# Patient Record
Sex: FEMALE | Race: ASIAN | NOT HISPANIC OR LATINO | Employment: UNEMPLOYED
[De-identification: names, ages, dates, MRNs, and addresses within clinical notes are randomized per-mention and may not be internally consistent; named-entity substitution may affect disease eponyms.]

---

## 2017-02-09 ENCOUNTER — RECORDS - HEALTHEAST (OUTPATIENT)
Dept: ADMINISTRATIVE | Facility: OTHER | Age: 62
End: 2017-02-09

## 2017-08-17 ENCOUNTER — RECORDS - HEALTHEAST (OUTPATIENT)
Dept: ADMINISTRATIVE | Facility: OTHER | Age: 62
End: 2017-08-17

## 2017-12-08 ENCOUNTER — RECORDS - HEALTHEAST (OUTPATIENT)
Dept: ADMINISTRATIVE | Facility: OTHER | Age: 62
End: 2017-12-08

## 2018-05-18 ENCOUNTER — RECORDS - HEALTHEAST (OUTPATIENT)
Dept: ADMINISTRATIVE | Facility: OTHER | Age: 63
End: 2018-05-18

## 2018-11-08 ENCOUNTER — OFFICE VISIT - HEALTHEAST (OUTPATIENT)
Dept: FAMILY MEDICINE | Facility: CLINIC | Age: 63
End: 2018-11-08

## 2018-11-08 DIAGNOSIS — Z23 NEED FOR VACCINATION: ICD-10-CM

## 2018-11-08 DIAGNOSIS — M54.2 CERVICAL PAIN: ICD-10-CM

## 2018-11-08 DIAGNOSIS — E11.9 DIABETES MELLITUS, TYPE 2 (H): ICD-10-CM

## 2018-11-08 DIAGNOSIS — S32.001A LUMBAR BURST FRACTURE (H): ICD-10-CM

## 2018-11-08 DIAGNOSIS — R73.9 HYPERGLYCEMIA: ICD-10-CM

## 2018-11-08 LAB
ALBUMIN SERPL-MCNC: 3.8 G/DL (ref 3.5–5)
ALP SERPL-CCNC: 88 U/L (ref 45–120)
ALT SERPL W P-5'-P-CCNC: 16 U/L (ref 0–45)
ANION GAP SERPL CALCULATED.3IONS-SCNC: 10 MMOL/L (ref 5–18)
AST SERPL W P-5'-P-CCNC: 19 U/L (ref 0–40)
BILIRUB SERPL-MCNC: 0.8 MG/DL (ref 0–1)
BUN SERPL-MCNC: 12 MG/DL (ref 8–22)
CALCIUM SERPL-MCNC: 9.4 MG/DL (ref 8.5–10.5)
CHLORIDE BLD-SCNC: 103 MMOL/L (ref 98–107)
CHOLEST SERPL-MCNC: 196 MG/DL
CO2 SERPL-SCNC: 27 MMOL/L (ref 22–31)
CREAT SERPL-MCNC: 0.63 MG/DL (ref 0.6–1.1)
FASTING STATUS PATIENT QL REPORTED: NO
GFR SERPL CREATININE-BSD FRML MDRD: >60 ML/MIN/1.73M2
GLUCOSE BLD-MCNC: 134 MG/DL (ref 70–125)
HBA1C MFR BLD: 7.4 % (ref 3.5–6)
HDLC SERPL-MCNC: 41 MG/DL
LDLC SERPL CALC-MCNC: 126 MG/DL
POTASSIUM BLD-SCNC: 4 MMOL/L (ref 3.5–5)
PROT SERPL-MCNC: 8.5 G/DL (ref 6–8)
SODIUM SERPL-SCNC: 140 MMOL/L (ref 136–145)
TRIGL SERPL-MCNC: 144 MG/DL

## 2018-11-08 ASSESSMENT — MIFFLIN-ST. JEOR: SCORE: 1469.95

## 2018-12-04 ENCOUNTER — OFFICE VISIT - HEALTHEAST (OUTPATIENT)
Dept: FAMILY MEDICINE | Facility: CLINIC | Age: 63
End: 2018-12-04

## 2018-12-04 DIAGNOSIS — R80.9 MICROALBUMINURIA: ICD-10-CM

## 2018-12-04 DIAGNOSIS — E11.9 TYPE 2 DIABETES MELLITUS WITHOUT COMPLICATION, WITHOUT LONG-TERM CURRENT USE OF INSULIN (H): ICD-10-CM

## 2018-12-04 LAB
CREAT UR-MCNC: 65.5 MG/DL
MICROALBUMIN UR-MCNC: 3.86 MG/DL (ref 0–1.99)
MICROALBUMIN/CREAT UR: 58.9 MG/G

## 2018-12-04 ASSESSMENT — MIFFLIN-ST. JEOR: SCORE: 911.49

## 2018-12-05 ENCOUNTER — COMMUNICATION - HEALTHEAST (OUTPATIENT)
Dept: FAMILY MEDICINE | Facility: CLINIC | Age: 63
End: 2018-12-05

## 2019-01-15 ENCOUNTER — OFFICE VISIT - HEALTHEAST (OUTPATIENT)
Dept: FAMILY MEDICINE | Facility: CLINIC | Age: 64
End: 2019-01-15

## 2019-01-15 DIAGNOSIS — E11.9 TYPE 2 DIABETES MELLITUS WITHOUT COMPLICATION, WITHOUT LONG-TERM CURRENT USE OF INSULIN (H): ICD-10-CM

## 2019-01-15 DIAGNOSIS — Z12.31 VISIT FOR SCREENING MAMMOGRAM: ICD-10-CM

## 2019-01-15 DIAGNOSIS — R80.9 MICROALBUMINURIA: ICD-10-CM

## 2019-01-15 DIAGNOSIS — E78.5 HYPERLIPIDEMIA, UNSPECIFIED HYPERLIPIDEMIA TYPE: ICD-10-CM

## 2019-01-15 LAB
ALT SERPL W P-5'-P-CCNC: 23 U/L (ref 0–45)
ANION GAP SERPL CALCULATED.3IONS-SCNC: 10 MMOL/L (ref 5–18)
AST SERPL W P-5'-P-CCNC: 22 U/L (ref 0–40)
BUN SERPL-MCNC: 10 MG/DL (ref 8–22)
CALCIUM SERPL-MCNC: 9.1 MG/DL (ref 8.5–10.5)
CHLORIDE BLD-SCNC: 104 MMOL/L (ref 98–107)
CHOLEST SERPL-MCNC: 137 MG/DL
CO2 SERPL-SCNC: 25 MMOL/L (ref 22–31)
CREAT SERPL-MCNC: 0.64 MG/DL (ref 0.6–1.1)
FASTING STATUS PATIENT QL REPORTED: YES
GFR SERPL CREATININE-BSD FRML MDRD: >60 ML/MIN/1.73M2
GLUCOSE BLD-MCNC: 114 MG/DL (ref 70–125)
HBA1C MFR BLD: 7.5 % (ref 3.5–6)
HDLC SERPL-MCNC: 34 MG/DL
LDLC SERPL CALC-MCNC: 67 MG/DL
POTASSIUM BLD-SCNC: 3.9 MMOL/L (ref 3.5–5)
SODIUM SERPL-SCNC: 139 MMOL/L (ref 136–145)
TRIGL SERPL-MCNC: 180 MG/DL

## 2019-01-21 ENCOUNTER — COMMUNICATION - HEALTHEAST (OUTPATIENT)
Dept: FAMILY MEDICINE | Facility: CLINIC | Age: 64
End: 2019-01-21

## 2019-04-22 ENCOUNTER — RECORDS - HEALTHEAST (OUTPATIENT)
Dept: RADIOLOGY | Facility: CLINIC | Age: 64
End: 2019-04-22

## 2019-04-22 ENCOUNTER — OFFICE VISIT - HEALTHEAST (OUTPATIENT)
Dept: FAMILY MEDICINE | Facility: CLINIC | Age: 64
End: 2019-04-22

## 2019-04-22 ENCOUNTER — RECORDS - HEALTHEAST (OUTPATIENT)
Dept: MAMMOGRAPHY | Facility: CLINIC | Age: 64
End: 2019-04-22

## 2019-04-22 DIAGNOSIS — E11.9 TYPE 2 DIABETES MELLITUS WITHOUT COMPLICATION, WITHOUT LONG-TERM CURRENT USE OF INSULIN (H): ICD-10-CM

## 2019-04-22 DIAGNOSIS — Z12.11 SCREEN FOR COLON CANCER: ICD-10-CM

## 2019-04-22 DIAGNOSIS — M54.2 CERVICAL PAIN: ICD-10-CM

## 2019-04-22 DIAGNOSIS — E78.5 HYPERLIPIDEMIA, UNSPECIFIED HYPERLIPIDEMIA TYPE: ICD-10-CM

## 2019-04-22 DIAGNOSIS — R80.9 MICROALBUMINURIA: ICD-10-CM

## 2019-04-22 DIAGNOSIS — Z12.31 VISIT FOR SCREENING MAMMOGRAM: ICD-10-CM

## 2019-04-22 DIAGNOSIS — Z12.31 ENCOUNTER FOR SCREENING MAMMOGRAM FOR MALIGNANT NEOPLASM OF BREAST: ICD-10-CM

## 2019-04-22 LAB — HBA1C MFR BLD: 10.3 % (ref 3.5–6)

## 2019-04-22 RX ORDER — ACETAMINOPHEN 500 MG
TABLET ORAL
Qty: 60 TABLET | Refills: 1 | Status: SHIPPED | OUTPATIENT
Start: 2019-04-22 | End: 2024-08-29

## 2019-04-23 ENCOUNTER — AMBULATORY - HEALTHEAST (OUTPATIENT)
Dept: FAMILY MEDICINE | Facility: CLINIC | Age: 64
End: 2019-04-23

## 2019-04-23 DIAGNOSIS — E11.9 TYPE 2 DIABETES MELLITUS WITHOUT COMPLICATION, WITHOUT LONG-TERM CURRENT USE OF INSULIN (H): ICD-10-CM

## 2019-05-22 ENCOUNTER — RECORDS - HEALTHEAST (OUTPATIENT)
Dept: ADMINISTRATIVE | Facility: OTHER | Age: 64
End: 2019-05-22

## 2019-06-07 ENCOUNTER — RECORDS - HEALTHEAST (OUTPATIENT)
Dept: HEALTH INFORMATION MANAGEMENT | Facility: CLINIC | Age: 64
End: 2019-06-07

## 2019-07-22 ENCOUNTER — OFFICE VISIT - HEALTHEAST (OUTPATIENT)
Dept: FAMILY MEDICINE | Facility: CLINIC | Age: 64
End: 2019-07-22

## 2019-07-22 DIAGNOSIS — R80.9 MICROALBUMINURIA: ICD-10-CM

## 2019-07-22 DIAGNOSIS — E11.9 TYPE 2 DIABETES MELLITUS WITHOUT COMPLICATION, WITHOUT LONG-TERM CURRENT USE OF INSULIN (H): ICD-10-CM

## 2019-07-22 DIAGNOSIS — E78.5 HYPERLIPIDEMIA, UNSPECIFIED HYPERLIPIDEMIA TYPE: ICD-10-CM

## 2019-07-22 LAB — HBA1C MFR BLD: 7.3 % (ref 3.5–6)

## 2019-07-23 ENCOUNTER — COMMUNICATION - HEALTHEAST (OUTPATIENT)
Dept: FAMILY MEDICINE | Facility: CLINIC | Age: 64
End: 2019-07-23

## 2019-10-22 ENCOUNTER — COMMUNICATION - HEALTHEAST (OUTPATIENT)
Dept: NURSING | Facility: CLINIC | Age: 64
End: 2019-10-22

## 2019-10-29 ENCOUNTER — OFFICE VISIT - HEALTHEAST (OUTPATIENT)
Dept: FAMILY MEDICINE | Facility: CLINIC | Age: 64
End: 2019-10-29

## 2019-10-29 DIAGNOSIS — E78.5 HYPERLIPIDEMIA, UNSPECIFIED HYPERLIPIDEMIA TYPE: ICD-10-CM

## 2019-10-29 DIAGNOSIS — E11.29 TYPE 2 DIABETES MELLITUS WITH MICROALBUMINURIA, WITHOUT LONG-TERM CURRENT USE OF INSULIN (H): ICD-10-CM

## 2019-10-29 DIAGNOSIS — R80.9 MICROALBUMINURIA: ICD-10-CM

## 2019-10-29 DIAGNOSIS — R80.9 TYPE 2 DIABETES MELLITUS WITH MICROALBUMINURIA, WITHOUT LONG-TERM CURRENT USE OF INSULIN (H): ICD-10-CM

## 2019-10-29 LAB
ANION GAP SERPL CALCULATED.3IONS-SCNC: 12 MMOL/L (ref 5–18)
BUN SERPL-MCNC: 10 MG/DL (ref 8–22)
CALCIUM SERPL-MCNC: 9.2 MG/DL (ref 8.5–10.5)
CHLORIDE BLD-SCNC: 104 MMOL/L (ref 98–107)
CO2 SERPL-SCNC: 25 MMOL/L (ref 22–31)
CREAT SERPL-MCNC: 0.64 MG/DL (ref 0.6–1.1)
GFR SERPL CREATININE-BSD FRML MDRD: >60 ML/MIN/1.73M2
GLUCOSE BLD-MCNC: 115 MG/DL (ref 70–125)
HBA1C MFR BLD: 6.5 % (ref 3.5–6)
POTASSIUM BLD-SCNC: 3.8 MMOL/L (ref 3.5–5)
SODIUM SERPL-SCNC: 141 MMOL/L (ref 136–145)

## 2019-11-04 ENCOUNTER — COMMUNICATION - HEALTHEAST (OUTPATIENT)
Dept: FAMILY MEDICINE | Facility: CLINIC | Age: 64
End: 2019-11-04

## 2020-02-04 ENCOUNTER — OFFICE VISIT - HEALTHEAST (OUTPATIENT)
Dept: FAMILY MEDICINE | Facility: CLINIC | Age: 65
End: 2020-02-04

## 2020-02-04 DIAGNOSIS — R80.9 MICROALBUMINURIA: ICD-10-CM

## 2020-02-04 DIAGNOSIS — E11.29 TYPE 2 DIABETES MELLITUS WITH MICROALBUMINURIA, WITHOUT LONG-TERM CURRENT USE OF INSULIN (H): ICD-10-CM

## 2020-02-04 DIAGNOSIS — Z12.11 SPECIAL SCREENING FOR MALIGNANT NEOPLASMS, COLON: ICD-10-CM

## 2020-02-04 DIAGNOSIS — E78.5 HYPERLIPIDEMIA, UNSPECIFIED HYPERLIPIDEMIA TYPE: ICD-10-CM

## 2020-02-04 DIAGNOSIS — R80.9 TYPE 2 DIABETES MELLITUS WITH MICROALBUMINURIA, WITHOUT LONG-TERM CURRENT USE OF INSULIN (H): ICD-10-CM

## 2020-02-04 LAB
ALT SERPL W P-5'-P-CCNC: 13 U/L (ref 0–45)
AST SERPL W P-5'-P-CCNC: 15 U/L (ref 0–40)
CHOLEST SERPL-MCNC: 161 MG/DL
CREAT UR-MCNC: 53.7 MG/DL
FASTING STATUS PATIENT QL REPORTED: YES
HBA1C MFR BLD: 7.2 % (ref 3.5–6)
HDLC SERPL-MCNC: 34 MG/DL
LDLC SERPL CALC-MCNC: 91 MG/DL
MICROALBUMIN UR-MCNC: 0.99 MG/DL (ref 0–1.99)
MICROALBUMIN/CREAT UR: 18.4 MG/G
TRIGL SERPL-MCNC: 180 MG/DL

## 2020-02-04 ASSESSMENT — MIFFLIN-ST. JEOR: SCORE: 936.73

## 2020-02-10 ENCOUNTER — COMMUNICATION - HEALTHEAST (OUTPATIENT)
Dept: FAMILY MEDICINE | Facility: CLINIC | Age: 65
End: 2020-02-10

## 2020-02-13 ENCOUNTER — RECORDS - HEALTHEAST (OUTPATIENT)
Dept: ADMINISTRATIVE | Facility: OTHER | Age: 65
End: 2020-02-13

## 2020-02-20 ENCOUNTER — AMBULATORY - HEALTHEAST (OUTPATIENT)
Dept: EDUCATION SERVICES | Facility: CLINIC | Age: 65
End: 2020-02-20

## 2020-02-20 DIAGNOSIS — E11.29 TYPE 2 DIABETES MELLITUS WITH MICROALBUMINURIA, WITHOUT LONG-TERM CURRENT USE OF INSULIN (H): ICD-10-CM

## 2020-02-20 DIAGNOSIS — R80.9 TYPE 2 DIABETES MELLITUS WITH MICROALBUMINURIA, WITHOUT LONG-TERM CURRENT USE OF INSULIN (H): ICD-10-CM

## 2020-02-28 ENCOUNTER — COMMUNICATION - HEALTHEAST (OUTPATIENT)
Dept: FAMILY MEDICINE | Facility: CLINIC | Age: 65
End: 2020-02-28

## 2020-02-28 DIAGNOSIS — R80.9 TYPE 2 DIABETES MELLITUS WITH MICROALBUMINURIA, WITHOUT LONG-TERM CURRENT USE OF INSULIN (H): ICD-10-CM

## 2020-02-28 DIAGNOSIS — E11.29 TYPE 2 DIABETES MELLITUS WITH MICROALBUMINURIA, WITHOUT LONG-TERM CURRENT USE OF INSULIN (H): ICD-10-CM

## 2020-03-02 ENCOUNTER — COMMUNICATION - HEALTHEAST (OUTPATIENT)
Dept: FAMILY MEDICINE | Facility: CLINIC | Age: 65
End: 2020-03-02

## 2020-03-02 DIAGNOSIS — E11.29 TYPE 2 DIABETES MELLITUS WITH MICROALBUMINURIA, WITHOUT LONG-TERM CURRENT USE OF INSULIN (H): ICD-10-CM

## 2020-03-02 DIAGNOSIS — R80.9 TYPE 2 DIABETES MELLITUS WITH MICROALBUMINURIA, WITHOUT LONG-TERM CURRENT USE OF INSULIN (H): ICD-10-CM

## 2020-03-02 RX ORDER — LANCING DEVICE
EACH MISCELLANEOUS
Qty: 100 EACH | Refills: 3 | Status: SHIPPED | OUTPATIENT
Start: 2020-03-02 | End: 2024-08-29

## 2020-03-25 ENCOUNTER — AMBULATORY - HEALTHEAST (OUTPATIENT)
Dept: EDUCATION SERVICES | Facility: CLINIC | Age: 65
End: 2020-03-25

## 2020-03-25 DIAGNOSIS — E11.29 TYPE 2 DIABETES MELLITUS WITH MICROALBUMINURIA, WITHOUT LONG-TERM CURRENT USE OF INSULIN (H): ICD-10-CM

## 2020-03-25 DIAGNOSIS — R80.9 TYPE 2 DIABETES MELLITUS WITH MICROALBUMINURIA, WITHOUT LONG-TERM CURRENT USE OF INSULIN (H): ICD-10-CM

## 2020-05-05 ENCOUNTER — OFFICE VISIT - HEALTHEAST (OUTPATIENT)
Dept: FAMILY MEDICINE | Facility: CLINIC | Age: 65
End: 2020-05-05

## 2020-05-05 DIAGNOSIS — Z12.11 SPECIAL SCREENING FOR MALIGNANT NEOPLASMS, COLON: ICD-10-CM

## 2020-05-05 DIAGNOSIS — E11.29 TYPE 2 DIABETES MELLITUS WITH MICROALBUMINURIA, WITHOUT LONG-TERM CURRENT USE OF INSULIN (H): ICD-10-CM

## 2020-05-05 DIAGNOSIS — R80.9 MICROALBUMINURIA: ICD-10-CM

## 2020-05-05 DIAGNOSIS — R80.9 TYPE 2 DIABETES MELLITUS WITH MICROALBUMINURIA, WITHOUT LONG-TERM CURRENT USE OF INSULIN (H): ICD-10-CM

## 2020-05-05 DIAGNOSIS — S32.001S CLOSED BURST FRACTURE OF LUMBAR VERTEBRA, SEQUELA: ICD-10-CM

## 2020-05-05 DIAGNOSIS — E78.5 HYPERLIPIDEMIA, UNSPECIFIED HYPERLIPIDEMIA TYPE: ICD-10-CM

## 2020-06-17 ENCOUNTER — COMMUNICATION - HEALTHEAST (OUTPATIENT)
Dept: FAMILY MEDICINE | Facility: CLINIC | Age: 65
End: 2020-06-17

## 2020-06-17 ENCOUNTER — OFFICE VISIT - HEALTHEAST (OUTPATIENT)
Dept: FAMILY MEDICINE | Facility: CLINIC | Age: 65
End: 2020-06-17

## 2020-06-17 DIAGNOSIS — E11.29 TYPE 2 DIABETES MELLITUS WITH MICROALBUMINURIA, WITHOUT LONG-TERM CURRENT USE OF INSULIN (H): ICD-10-CM

## 2020-06-17 DIAGNOSIS — R10.13 ABDOMINAL PAIN, EPIGASTRIC: ICD-10-CM

## 2020-06-17 DIAGNOSIS — R80.9 MICROALBUMINURIA: ICD-10-CM

## 2020-06-17 DIAGNOSIS — E78.5 HYPERLIPIDEMIA, UNSPECIFIED HYPERLIPIDEMIA TYPE: ICD-10-CM

## 2020-06-17 DIAGNOSIS — R80.9 TYPE 2 DIABETES MELLITUS WITH MICROALBUMINURIA, WITHOUT LONG-TERM CURRENT USE OF INSULIN (H): ICD-10-CM

## 2020-07-02 ENCOUNTER — AMBULATORY - HEALTHEAST (OUTPATIENT)
Dept: LAB | Facility: CLINIC | Age: 65
End: 2020-07-02

## 2020-07-02 DIAGNOSIS — R80.9 TYPE 2 DIABETES MELLITUS WITH MICROALBUMINURIA, WITHOUT LONG-TERM CURRENT USE OF INSULIN (H): ICD-10-CM

## 2020-07-02 DIAGNOSIS — E11.29 TYPE 2 DIABETES MELLITUS WITH MICROALBUMINURIA, WITHOUT LONG-TERM CURRENT USE OF INSULIN (H): ICD-10-CM

## 2020-07-02 LAB
ANION GAP SERPL CALCULATED.3IONS-SCNC: 10 MMOL/L (ref 5–18)
BUN SERPL-MCNC: 12 MG/DL (ref 8–22)
CALCIUM SERPL-MCNC: 9 MG/DL (ref 8.5–10.5)
CHLORIDE BLD-SCNC: 105 MMOL/L (ref 98–107)
CO2 SERPL-SCNC: 22 MMOL/L (ref 22–31)
CREAT SERPL-MCNC: 0.61 MG/DL (ref 0.6–1.1)
GFR SERPL CREATININE-BSD FRML MDRD: >60 ML/MIN/1.73M2
GLUCOSE BLD-MCNC: 114 MG/DL (ref 70–125)
HBA1C MFR BLD: 5.8 % (ref 3.5–6)
POTASSIUM BLD-SCNC: 3.8 MMOL/L (ref 3.5–5)
SODIUM SERPL-SCNC: 137 MMOL/L (ref 136–145)

## 2020-08-04 ENCOUNTER — RECORDS - HEALTHEAST (OUTPATIENT)
Dept: ADMINISTRATIVE | Facility: OTHER | Age: 65
End: 2020-08-04

## 2020-09-16 ENCOUNTER — RECORDS - HEALTHEAST (OUTPATIENT)
Dept: ADMINISTRATIVE | Facility: OTHER | Age: 65
End: 2020-09-16

## 2020-09-21 ENCOUNTER — OFFICE VISIT - HEALTHEAST (OUTPATIENT)
Dept: FAMILY MEDICINE | Facility: CLINIC | Age: 65
End: 2020-09-21

## 2020-09-21 DIAGNOSIS — S32.001D CLOSED BURST FRACTURE OF LUMBAR VERTEBRA WITH ROUTINE HEALING, SUBSEQUENT ENCOUNTER: ICD-10-CM

## 2020-09-21 DIAGNOSIS — S52.501S CLOSED FRACTURE OF DISTAL END OF RIGHT RADIUS, UNSPECIFIED FRACTURE MORPHOLOGY, SEQUELA: ICD-10-CM

## 2020-09-21 DIAGNOSIS — R80.9 MICROALBUMINURIA: ICD-10-CM

## 2020-09-21 DIAGNOSIS — E11.29 TYPE 2 DIABETES MELLITUS WITH MICROALBUMINURIA, WITHOUT LONG-TERM CURRENT USE OF INSULIN (H): ICD-10-CM

## 2020-09-21 DIAGNOSIS — E78.5 HYPERLIPIDEMIA, UNSPECIFIED HYPERLIPIDEMIA TYPE: ICD-10-CM

## 2020-09-21 DIAGNOSIS — E55.9 VITAMIN D DEFICIENCY: ICD-10-CM

## 2020-09-21 DIAGNOSIS — R80.9 TYPE 2 DIABETES MELLITUS WITH MICROALBUMINURIA, WITHOUT LONG-TERM CURRENT USE OF INSULIN (H): ICD-10-CM

## 2020-09-29 ENCOUNTER — AMBULATORY - HEALTHEAST (OUTPATIENT)
Dept: LAB | Facility: CLINIC | Age: 65
End: 2020-09-29

## 2020-09-29 ENCOUNTER — AMBULATORY - HEALTHEAST (OUTPATIENT)
Dept: NURSING | Facility: CLINIC | Age: 65
End: 2020-09-29

## 2020-09-29 DIAGNOSIS — E11.29 TYPE 2 DIABETES MELLITUS WITH MICROALBUMINURIA, WITHOUT LONG-TERM CURRENT USE OF INSULIN (H): ICD-10-CM

## 2020-09-29 DIAGNOSIS — E55.9 VITAMIN D DEFICIENCY: ICD-10-CM

## 2020-09-29 DIAGNOSIS — R80.9 TYPE 2 DIABETES MELLITUS WITH MICROALBUMINURIA, WITHOUT LONG-TERM CURRENT USE OF INSULIN (H): ICD-10-CM

## 2020-09-29 LAB — HBA1C MFR BLD: 5.8 %

## 2020-09-30 LAB — 25(OH)D3 SERPL-MCNC: 18.3 NG/ML (ref 30–80)

## 2020-10-01 ENCOUNTER — AMBULATORY - HEALTHEAST (OUTPATIENT)
Dept: FAMILY MEDICINE | Facility: CLINIC | Age: 65
End: 2020-10-01

## 2020-10-01 DIAGNOSIS — E56.9 VITAMIN DEFICIENCY: ICD-10-CM

## 2020-10-01 DIAGNOSIS — E55.9 VITAMIN D DEFICIENCY: ICD-10-CM

## 2020-10-08 ENCOUNTER — COMMUNICATION - HEALTHEAST (OUTPATIENT)
Dept: FAMILY MEDICINE | Facility: CLINIC | Age: 65
End: 2020-10-08

## 2020-10-27 ENCOUNTER — COMMUNICATION - HEALTHEAST (OUTPATIENT)
Dept: FAMILY MEDICINE | Facility: CLINIC | Age: 65
End: 2020-10-27

## 2020-10-27 DIAGNOSIS — R80.9 TYPE 2 DIABETES MELLITUS WITH MICROALBUMINURIA, WITHOUT LONG-TERM CURRENT USE OF INSULIN (H): ICD-10-CM

## 2020-10-27 DIAGNOSIS — E11.29 TYPE 2 DIABETES MELLITUS WITH MICROALBUMINURIA, WITHOUT LONG-TERM CURRENT USE OF INSULIN (H): ICD-10-CM

## 2020-12-29 ENCOUNTER — OFFICE VISIT - HEALTHEAST (OUTPATIENT)
Dept: FAMILY MEDICINE | Facility: CLINIC | Age: 65
End: 2020-12-29

## 2020-12-29 DIAGNOSIS — E78.5 HYPERLIPIDEMIA, UNSPECIFIED HYPERLIPIDEMIA TYPE: ICD-10-CM

## 2020-12-29 DIAGNOSIS — R80.9 TYPE 2 DIABETES MELLITUS WITH MICROALBUMINURIA, WITHOUT LONG-TERM CURRENT USE OF INSULIN (H): ICD-10-CM

## 2020-12-29 DIAGNOSIS — E55.9 VITAMIN D DEFICIENCY: ICD-10-CM

## 2020-12-29 DIAGNOSIS — E11.29 TYPE 2 DIABETES MELLITUS WITH MICROALBUMINURIA, WITHOUT LONG-TERM CURRENT USE OF INSULIN (H): ICD-10-CM

## 2020-12-29 DIAGNOSIS — R80.9 MICROALBUMINURIA: ICD-10-CM

## 2021-01-05 ENCOUNTER — AMBULATORY - HEALTHEAST (OUTPATIENT)
Dept: LAB | Facility: CLINIC | Age: 66
End: 2021-01-05

## 2021-01-05 DIAGNOSIS — E11.29 TYPE 2 DIABETES MELLITUS WITH MICROALBUMINURIA, WITHOUT LONG-TERM CURRENT USE OF INSULIN (H): ICD-10-CM

## 2021-01-05 DIAGNOSIS — E55.9 VITAMIN D DEFICIENCY: ICD-10-CM

## 2021-01-05 DIAGNOSIS — R80.9 TYPE 2 DIABETES MELLITUS WITH MICROALBUMINURIA, WITHOUT LONG-TERM CURRENT USE OF INSULIN (H): ICD-10-CM

## 2021-01-05 LAB — HBA1C MFR BLD: 6.2 %

## 2021-01-06 LAB — 25(OH)D3 SERPL-MCNC: 10.4 NG/ML (ref 30–80)

## 2021-01-07 ENCOUNTER — RECORDS - HEALTHEAST (OUTPATIENT)
Dept: BONE DENSITY | Facility: CLINIC | Age: 66
End: 2021-01-07

## 2021-01-07 ENCOUNTER — RECORDS - HEALTHEAST (OUTPATIENT)
Dept: ADMINISTRATIVE | Facility: OTHER | Age: 66
End: 2021-01-07

## 2021-01-07 DIAGNOSIS — S32.001D: ICD-10-CM

## 2021-01-07 DIAGNOSIS — S52.501S UNSPECIFIED FRACTURE OF THE LOWER END OF RIGHT RADIUS, SEQUELA: ICD-10-CM

## 2021-01-09 ENCOUNTER — AMBULATORY - HEALTHEAST (OUTPATIENT)
Dept: FAMILY MEDICINE | Facility: CLINIC | Age: 66
End: 2021-01-09

## 2021-01-09 DIAGNOSIS — M81.0 OSTEOPOROSIS, UNSPECIFIED OSTEOPOROSIS TYPE, UNSPECIFIED PATHOLOGICAL FRACTURE PRESENCE: ICD-10-CM

## 2021-01-09 DIAGNOSIS — E56.9 VITAMIN DEFICIENCY: ICD-10-CM

## 2021-01-12 ENCOUNTER — COMMUNICATION - HEALTHEAST (OUTPATIENT)
Dept: FAMILY MEDICINE | Facility: CLINIC | Age: 66
End: 2021-01-12

## 2021-01-18 ENCOUNTER — COMMUNICATION - HEALTHEAST (OUTPATIENT)
Dept: FAMILY MEDICINE | Facility: CLINIC | Age: 66
End: 2021-01-18

## 2021-01-18 DIAGNOSIS — E78.5 HYPERLIPIDEMIA, UNSPECIFIED HYPERLIPIDEMIA TYPE: ICD-10-CM

## 2021-03-25 ENCOUNTER — AMBULATORY - HEALTHEAST (OUTPATIENT)
Dept: FAMILY MEDICINE | Facility: CLINIC | Age: 66
End: 2021-03-25

## 2021-03-25 ENCOUNTER — OFFICE VISIT - HEALTHEAST (OUTPATIENT)
Dept: FAMILY MEDICINE | Facility: CLINIC | Age: 66
End: 2021-03-25

## 2021-03-25 DIAGNOSIS — E11.29 TYPE 2 DIABETES MELLITUS WITH MICROALBUMINURIA, WITHOUT LONG-TERM CURRENT USE OF INSULIN (H): ICD-10-CM

## 2021-03-25 DIAGNOSIS — Z12.11 COLON CANCER SCREENING: ICD-10-CM

## 2021-03-25 DIAGNOSIS — R80.9 MICROALBUMINURIA: ICD-10-CM

## 2021-03-25 DIAGNOSIS — Z12.31 VISIT FOR SCREENING MAMMOGRAM: ICD-10-CM

## 2021-03-25 DIAGNOSIS — E56.9 VITAMIN DEFICIENCY: ICD-10-CM

## 2021-03-25 DIAGNOSIS — M81.0 OSTEOPOROSIS, UNSPECIFIED OSTEOPOROSIS TYPE, UNSPECIFIED PATHOLOGICAL FRACTURE PRESENCE: ICD-10-CM

## 2021-03-25 DIAGNOSIS — R80.9 TYPE 2 DIABETES MELLITUS WITH MICROALBUMINURIA, WITHOUT LONG-TERM CURRENT USE OF INSULIN (H): ICD-10-CM

## 2021-03-25 DIAGNOSIS — E78.5 HYPERLIPIDEMIA, UNSPECIFIED HYPERLIPIDEMIA TYPE: ICD-10-CM

## 2021-03-25 RX ORDER — ATORVASTATIN CALCIUM 20 MG/1
20 TABLET, FILM COATED ORAL DAILY
Qty: 90 TABLET | Refills: 1 | Status: SHIPPED | OUTPATIENT
Start: 2021-03-25 | End: 2021-12-09

## 2021-03-25 RX ORDER — GLIPIZIDE 10 MG/1
TABLET ORAL
Qty: 135 TABLET | Refills: 1 | Status: SHIPPED | OUTPATIENT
Start: 2021-03-25 | End: 2021-09-28

## 2021-03-25 RX ORDER — ALENDRONATE SODIUM 70 MG/1
70 TABLET ORAL
Qty: 12 TABLET | Refills: 1 | Status: SHIPPED | OUTPATIENT
Start: 2021-03-25 | End: 2022-09-23

## 2021-03-25 ASSESSMENT — PATIENT HEALTH QUESTIONNAIRE - PHQ9: SUM OF ALL RESPONSES TO PHQ QUESTIONS 1-9: 0

## 2021-04-03 ENCOUNTER — AMBULATORY - HEALTHEAST (OUTPATIENT)
Dept: MULTI SPECIALTY CLINIC | Facility: CLINIC | Age: 66
End: 2021-04-03

## 2021-04-03 LAB — COLOGUARD-ABSTRACT: NEGATIVE

## 2021-04-06 ENCOUNTER — RECORDS - HEALTHEAST (OUTPATIENT)
Dept: MAMMOGRAPHY | Facility: CLINIC | Age: 66
End: 2021-04-06

## 2021-04-06 DIAGNOSIS — Z12.31 ENCOUNTER FOR SCREENING MAMMOGRAM FOR MALIGNANT NEOPLASM OF BREAST: ICD-10-CM

## 2021-04-09 ENCOUNTER — AMBULATORY - HEALTHEAST (OUTPATIENT)
Dept: NURSING | Facility: CLINIC | Age: 66
End: 2021-04-09

## 2021-04-09 ENCOUNTER — AMBULATORY - HEALTHEAST (OUTPATIENT)
Dept: LAB | Facility: CLINIC | Age: 66
End: 2021-04-09

## 2021-04-09 DIAGNOSIS — E11.29 TYPE 2 DIABETES MELLITUS WITH MICROALBUMINURIA, WITHOUT LONG-TERM CURRENT USE OF INSULIN (H): ICD-10-CM

## 2021-04-09 DIAGNOSIS — E56.9 VITAMIN DEFICIENCY: ICD-10-CM

## 2021-04-09 DIAGNOSIS — E78.5 HYPERLIPIDEMIA, UNSPECIFIED HYPERLIPIDEMIA TYPE: ICD-10-CM

## 2021-04-09 DIAGNOSIS — R80.9 TYPE 2 DIABETES MELLITUS WITH MICROALBUMINURIA, WITHOUT LONG-TERM CURRENT USE OF INSULIN (H): ICD-10-CM

## 2021-04-09 LAB
ALT SERPL W P-5'-P-CCNC: 21 U/L (ref 0–45)
AST SERPL W P-5'-P-CCNC: 18 U/L (ref 0–40)
CHOLEST SERPL-MCNC: 167 MG/DL
CREAT UR-MCNC: 23.8 MG/DL
FASTING STATUS PATIENT QL REPORTED: YES
HBA1C MFR BLD: 6.8 %
HDLC SERPL-MCNC: 41 MG/DL
LDLC SERPL CALC-MCNC: 102 MG/DL
MICROALBUMIN UR-MCNC: <0.5 MG/DL (ref 0–1.99)
MICROALBUMIN/CREAT UR: NORMAL MG/G{CREAT}
TRIGL SERPL-MCNC: 122 MG/DL

## 2021-04-12 LAB — 25(OH)D3 SERPL-MCNC: 16.7 NG/ML (ref 30–80)

## 2021-04-13 ENCOUNTER — AMBULATORY - HEALTHEAST (OUTPATIENT)
Dept: FAMILY MEDICINE | Facility: CLINIC | Age: 66
End: 2021-04-13

## 2021-04-13 DIAGNOSIS — R80.9 MICROALBUMINURIA: ICD-10-CM

## 2021-04-13 DIAGNOSIS — R80.9 TYPE 2 DIABETES MELLITUS WITH MICROALBUMINURIA, WITHOUT LONG-TERM CURRENT USE OF INSULIN (H): ICD-10-CM

## 2021-04-13 DIAGNOSIS — I10 BENIGN ESSENTIAL HYPERTENSION: ICD-10-CM

## 2021-04-13 DIAGNOSIS — E55.9 VITAMIN D DEFICIENCY: ICD-10-CM

## 2021-04-13 DIAGNOSIS — E11.29 TYPE 2 DIABETES MELLITUS WITH MICROALBUMINURIA, WITHOUT LONG-TERM CURRENT USE OF INSULIN (H): ICD-10-CM

## 2021-04-13 RX ORDER — LOSARTAN POTASSIUM 50 MG/1
50 TABLET ORAL DAILY
Qty: 30 TABLET | Refills: 3 | Status: SHIPPED | OUTPATIENT
Start: 2021-04-13 | End: 2022-04-07

## 2021-04-14 ENCOUNTER — COMMUNICATION - HEALTHEAST (OUTPATIENT)
Dept: FAMILY MEDICINE | Facility: CLINIC | Age: 66
End: 2021-04-14

## 2021-05-03 ENCOUNTER — COMMUNICATION - HEALTHEAST (OUTPATIENT)
Dept: FAMILY MEDICINE | Facility: CLINIC | Age: 66
End: 2021-05-03

## 2021-05-03 DIAGNOSIS — R80.9 TYPE 2 DIABETES MELLITUS WITH MICROALBUMINURIA, WITHOUT LONG-TERM CURRENT USE OF INSULIN (H): ICD-10-CM

## 2021-05-03 DIAGNOSIS — E11.29 TYPE 2 DIABETES MELLITUS WITH MICROALBUMINURIA, WITHOUT LONG-TERM CURRENT USE OF INSULIN (H): ICD-10-CM

## 2021-05-04 RX ORDER — LANCETS
EACH MISCELLANEOUS
Qty: 100 EACH | Refills: 3 | Status: SHIPPED | OUTPATIENT
Start: 2021-05-04 | End: 2021-09-29

## 2021-05-04 RX ORDER — BLOOD SUGAR DIAGNOSTIC
STRIP MISCELLANEOUS
Qty: 100 STRIP | Refills: 3 | Status: SHIPPED | OUTPATIENT
Start: 2021-05-04 | End: 2022-06-27

## 2021-05-27 VITALS — SYSTOLIC BLOOD PRESSURE: 144 MMHG | HEART RATE: 74 BPM | DIASTOLIC BLOOD PRESSURE: 81 MMHG

## 2021-05-27 ASSESSMENT — PATIENT HEALTH QUESTIONNAIRE - PHQ9: SUM OF ALL RESPONSES TO PHQ QUESTIONS 1-9: 0

## 2021-05-28 NOTE — PROGRESS NOTES
Subjective: This patient comes in for follow-up.  Was seen back on 1/15/2019.  She has diabetes    Is been on Januvia 100 mg a day, A1c was 7.5.  She had nausea from metformin    Not checking sugars because she does not have a machine.  She has been stable will see where her A1c is at    LDL was 67 she takes atorvastatin also has microalbuminuria, it is on losartan    No additional concerns or issues.    We discussed health maintenance issues and referred her to [facility] also again for mammogram she refused any colon cancer screening.  Did discuss [place] guard as well.    No additional symptoms or problems    Tobacco status: She  reports that she has never smoked. She has never used smokeless tobacco.    Patient Active Problem List    Diagnosis Date Noted     Microalbuminuria 12/04/2018     Lumbar burst fracture (H) 11/10/2018     Diabetes mellitus, type 2 (H) 11/10/2018     Abnormality of gait 09/02/2008       Current Outpatient Medications   Medication Sig Dispense Refill     acetaminophen (TYLENOL EXTRA STRENGTH) 500 MG tablet 1-2 po three times a day prn pain 60 tablet 1     atorvastatin (LIPITOR) 20 MG tablet Take 1 tablet (20 mg total) by mouth daily. 30 tablet 6     losartan (COZAAR) 25 MG tablet Take 1 tablet (25 mg total) by mouth daily. 30 tablet 6     sitaGLIPtin (JANUVIA) 100 MG tablet Take 1 tablet (100 mg total) by mouth daily. With or without food 30 tablet 6     No current facility-administered medications for this visit.        ROS:   10 point review of systems positive as outlined otherwise negative    Objective:    /72 (Patient Site: Left Arm, Patient Position: Sitting, Cuff Size: Adult Regular)   Pulse 72   Resp 12   Wt 115 lb (52.2 kg)   LMP  (LMP Unknown)   BMI 25.78 kg/m    Body mass index is 25.78 kg/m .      General appearance no acute distress    HEENT neck negative no adenopathy oropharynx is clear pupils react normally    Lungs were clear no rales or rhonchi    Heart  was regular S1-S2 rate and 70s    She has some mild paraspinal tenderness in the posterior cervical area.    Abdomen soft nontender no guarding or rebound    Extremities without edema normal pulses normal sensation.    A1c pending from today    Assessment:  1. Type 2 diabetes mellitus without complication, without long-term current use of insulin (H)  atorvastatin (LIPITOR) 20 MG tablet    sitaGLIPtin (JANUVIA) 100 MG tablet    Ambulatory referral to Ophthalmology    Glycosylated Hemoglobin A1c   2. Cervical pain  acetaminophen (TYLENOL EXTRA STRENGTH) 500 MG tablet   3. Microalbuminuria  losartan (COZAAR) 25 MG tablet   4. Hyperlipidemia, unspecified hyperlipidemia type     5. Visit for screening mammogram  Mammo Screening Bilateral   6. Screen for colon cancer       Diabetes mellitus, await A1c.    Refill on Tylenol for neck pain    Microalbumin urea continue losartan monitor blood pressure    Hyperlipidemia controlled with the atorvastatin last LDL was 67 back in January.    Screening issues as discussed above    Plan: Patient will be contacted with the A1c recheck in 3 months    This transcription uses voice recognition software, which may contain typographical errors.

## 2021-05-30 NOTE — TELEPHONE ENCOUNTER
----- Message from Emiliano Zimmer MD sent at 7/22/2019  7:40 PM CDT -----  Please contact this patient, let her know that the diabetes is again under good control.  Stay on the Januvia 100 mg 1 a day    Also stay on her atorvastatin and losartan.    No changes in medicine.  Recheck in 3 months

## 2021-05-30 NOTE — PROGRESS NOTES
Subjective: This patient comes in for follow-up she was here back in April.    She has diabetes.  She is been on Januvia 100 mg a day she had an A1c of 7.5 but in April when up to 10.3 I started her on glipizide but she never picked it up.    She is not checking blood sugars she is just on Januvia.    She had side effects from metformin with nausea.  I did recheck A1c again today.  A1c was 7.3.    Since she never went on the glipizide and her A1c is okay we will have her continue just on the Januvia alone and recheck in 3 months.    Patient takes atorvastatin for lipids she is on 20 mg a day also takes losartan 25 mg daily blood pressure looks good he does have microalbumin urea.    Her last LDL in January was 67 and she had normal liver function tests.  BMP in January also was normal      Tobacco status: She  reports that she has never smoked. She has never used smokeless tobacco.    Patient Active Problem List    Diagnosis Date Noted     Hyperlipidemia, unspecified hyperlipidemia type 07/22/2019     Microalbuminuria 12/04/2018     Lumbar burst fracture (H) 11/10/2018     Diabetes mellitus, type 2 (H) 11/10/2018     Abnormality of gait 09/02/2008       Current Outpatient Medications   Medication Sig Dispense Refill     acetaminophen (TYLENOL EXTRA STRENGTH) 500 MG tablet 1-2 po three times a day prn pain 60 tablet 1     atorvastatin (LIPITOR) 20 MG tablet Take 1 tablet (20 mg total) by mouth daily. 30 tablet 6     glipiZIDE (GLUCOTROL XL) 10 MG 24 hr tablet 1 p.o. every morning with breakfast 30 tablet 3     losartan (COZAAR) 25 MG tablet Take 1 tablet (25 mg total) by mouth daily. 30 tablet 6     sitaGLIPtin (JANUVIA) 100 MG tablet Take 1 tablet (100 mg total) by mouth daily. With or without food 30 tablet 6     No current facility-administered medications for this visit.        ROS:   10 point review of systems positive as outlined above otherwise negative    Objective:    /64 (Patient Site: Left Arm,  Patient Position: Sitting, Cuff Size: Adult Regular)   Pulse 76   Wt 109 lb (49.4 kg)   LMP  (LMP Unknown)   BMI 24.44 kg/m    Body mass index is 24.44 kg/m .      General appearance no acute distress    HEENT neck is negative no JVD oropharynx is clear    Vital signs are stable    Lungs are clear no rales or rhonchi, heart was regular rate and 70s    Abdomen nontender no guarding rebound.    Extremities without edema pulses and sensation were normal.    Skin was normal no rashes    A1c at 7.3    Previous labs as outlined above    Results for orders placed or performed in visit on 07/22/19   Glycosylated Hemoglobin A1c   Result Value Ref Range    Hemoglobin A1c 7.3 (H) 3.5 - 6.0 %       Assessment:  1. Type 2 diabetes mellitus without complication, without long-term current use of insulin (H)  Glycosylated Hemoglobin A1c   2. Microalbuminuria     3. Hyperlipidemia, unspecified hyperlipidemia type       Diabetes mellitus stay on just the Januvia hold off on glipizide recheck in 3 months    Microalbumin urea continue on losartan    Hyperlipidemia controlled on atorvastatin 20 mg a day.        Plan: As outlined above recheck in 3 months    This transcription uses voice recognition software, which may contain typographical errors.

## 2021-05-30 NOTE — TELEPHONE ENCOUNTER
"Attempted #1 Call can not be completed at this time. Please try to call again later.     \" Okay to relay message\"    "

## 2021-06-02 VITALS — HEIGHT: 56 IN | BODY MASS INDEX: 24.97 KG/M2 | WEIGHT: 111 LBS

## 2021-06-02 VITALS — BODY MASS INDEX: 25.78 KG/M2 | WEIGHT: 115 LBS

## 2021-06-02 VITALS — HEIGHT: 72 IN | BODY MASS INDEX: 14.76 KG/M2 | WEIGHT: 109 LBS

## 2021-06-02 VITALS — WEIGHT: 116 LBS | BODY MASS INDEX: 26.01 KG/M2

## 2021-06-02 NOTE — TELEPHONE ENCOUNTER
Patient's daughter called to ask when the patient's appointment was. The daughter was informed and the appointment was rescheduled to 10/29/19.

## 2021-06-02 NOTE — PROGRESS NOTES
Subjective: This patient comes in for follow-up this 64-year-old female was here in July.    She had been I believe just on Januvia but actually seems like she is been taking glipizide right along.  10 mg.  She is not checking blood sugars    Last A1c was 7.3.    I did recheck that today    Also takes atorvastatin 20 mg daily we will recheck in 3 months check lipid again.  She has microalbumin urea and blood pressure looks good losartan 25 mg a day.  BMP was pending from today    She did want a flu shot    In the past he had some problems with nausea from metformin.    Otherwise denies any additional problems.    Did not want any preventative cares no Pap smear mammogram: Checks etc.    Tobacco status: She  reports that she has never smoked. She has never used smokeless tobacco.    Patient Active Problem List    Diagnosis Date Noted     Hyperlipidemia, unspecified hyperlipidemia type 07/22/2019     Microalbuminuria 12/04/2018     Lumbar burst fracture (H) 11/10/2018     Diabetes mellitus, type 2 (H) 11/10/2018     Abnormality of gait 09/02/2008       Current Outpatient Medications   Medication Sig Dispense Refill     acetaminophen (TYLENOL EXTRA STRENGTH) 500 MG tablet 1-2 po three times a day prn pain 60 tablet 1     atorvastatin (LIPITOR) 20 MG tablet Take 1 tablet (20 mg total) by mouth daily. 30 tablet 6     losartan (COZAAR) 25 MG tablet Take 1 tablet (25 mg total) by mouth daily. 30 tablet 6     SITagliptin (JANUVIA) 100 MG tablet Take 1 tablet (100 mg total) by mouth daily. With or without food 30 tablet 6     glipiZIDE (GLUCOTROL) 10 MG tablet Take 1 tablet (10 mg total) by mouth daily. 30 tablet 6     No current facility-administered medications for this visit.        ROS: 10 point review of systems positive as discussed above otherwise negative    Objective:    /72 (Patient Site: Left Arm, Patient Position: Sitting, Cuff Size: Adult Regular)   Pulse 68   Resp 12   Wt 112 lb 8 oz (51 kg)   LMP   (LMP Unknown)   BMI 25.22 kg/m    Body mass index is 25.22 kg/m .      General appearance no acute distress    HEENT neck negative oropharynx clear pupils react normally extract movements full    Lungs are clear no rales or rhonchi heart was regular S1-S2 rate in the 60s  Abdomen soft nontender no guarding or rebound  Lower extremities without edema normal pulses.    Skin is normal no rashes.  Normal sensation to light touch.    Labs A1c and BMP pending        Assessment:  1. Type 2 diabetes mellitus with microalbuminuria, without long-term current use of insulin (H)  glipiZIDE (GLUCOTROL) 10 MG tablet    SITagliptin (JANUVIA) 100 MG tablet    Glycosylated Hemoglobin A1c    Basic Metabolic Panel   2. Hyperlipidemia, unspecified hyperlipidemia type  atorvastatin (LIPITOR) 20 MG tablet   3. Microalbuminuria  losartan (COZAAR) 25 MG tablet     Diabetes mellitus, await A1c, and BMP    History of microalbumin blood pressure looks good    Hyperlipidemia recheck in 3 months check A1c, liver tests, microalbumin then    Flu shot given    Plan: As discussed above    This transcription uses voice recognition software, which may contain typographical errors.

## 2021-06-03 VITALS
RESPIRATION RATE: 12 BRPM | BODY MASS INDEX: 25.22 KG/M2 | DIASTOLIC BLOOD PRESSURE: 72 MMHG | SYSTOLIC BLOOD PRESSURE: 118 MMHG | WEIGHT: 112.5 LBS | HEART RATE: 68 BPM

## 2021-06-03 VITALS — WEIGHT: 109 LBS | BODY MASS INDEX: 24.44 KG/M2

## 2021-06-03 NOTE — TELEPHONE ENCOUNTER
----- Message from Emiliano Zimmer MD sent at 11/3/2019  8:35 AM CST -----  Please contact this patient, let her know that the diabetes level looked good, the A1c was    Also the kidney function was normal    No change in medications recheck in 3 months

## 2021-06-03 NOTE — TELEPHONE ENCOUNTER
Called and spoke with Ruben Cisneros , Message was given, Ruben Cisneros  understood, no further questions.

## 2021-06-04 VITALS
DIASTOLIC BLOOD PRESSURE: 80 MMHG | HEIGHT: 56 IN | SYSTOLIC BLOOD PRESSURE: 120 MMHG | WEIGHT: 117 LBS | TEMPERATURE: 97.9 F | HEART RATE: 72 BPM | BODY MASS INDEX: 26.32 KG/M2 | OXYGEN SATURATION: 98 %

## 2021-06-04 VITALS — WEIGHT: 117.5 LBS | BODY MASS INDEX: 26.46 KG/M2

## 2021-06-05 NOTE — TELEPHONE ENCOUNTER
----- Message from Emiliano Zimmer MD sent at 2/9/2020  1:43 PM CST -----  Please contact this patient, let her know that the goal cholesterol level showed that atorvastatin is working well.    The A1c was a little higher at 7.2.  Stay on the same medicines but try to watch diet closer regarding carbohydrates, less sugar bread pasta etc.    See me back for recheck in 3 months bring in all your medications

## 2021-06-05 NOTE — TELEPHONE ENCOUNTER
Called and spoke with Ruben Cisneros's Daughter, Amish (on CTC). Message was given, Ruben Cisneros's daughter understood, no further questions.

## 2021-06-05 NOTE — PROGRESS NOTES
Subjective: This patient comes in for follow-up evaluation.  She is seen on 10/29/2019.  She has diabetes with microalbuminuria and hyperlipidemia.  She is on glipizide 10 mg a day Januvia 100 mg a day also takes losartan 25 mg daily and atorvastatin 20 mg a day.    Denies any side effects.  She is not been checking blood sugars.  I did give her referral for diabetic education to learn how to check blood sugars and get a meter etc.    She seems like she is doing well she is here with her daughter    Weight is stable.    Denies any hypoglycemic type symptoms.    Patient has had no visual changes.  Denies any headache weight change fever chills.    We discussed health maintenance issues.  She has had a mammogram on 4/22/2019.  She was willing to fill out a Cologuard and this was facilitated today.    Lab work today includes lipid AST ALT microalbumin and A1c    Last A1c was 6.5 on 10/29/2019 with normal renal function at that time as well.    Tobacco status: She  reports that she has never smoked. She has never used smokeless tobacco.    Patient Active Problem List    Diagnosis Date Noted     Hyperlipidemia, unspecified hyperlipidemia type 07/22/2019     Microalbuminuria 12/04/2018     Lumbar burst fracture (H) 11/10/2018     Diabetes mellitus, type 2 (H) 11/10/2018     Abnormality of gait 09/02/2008       Current Outpatient Medications   Medication Sig Dispense Refill     acetaminophen (TYLENOL EXTRA STRENGTH) 500 MG tablet 1-2 po three times a day prn pain 60 tablet 1     atorvastatin (LIPITOR) 20 MG tablet Take 1 tablet (20 mg total) by mouth daily. 30 tablet 6     glipiZIDE (GLUCOTROL) 10 MG tablet Take 1 tablet (10 mg total) by mouth daily. 30 tablet 6     losartan (COZAAR) 25 MG tablet Take 1 tablet (25 mg total) by mouth daily. 30 tablet 6     SITagliptin (JANUVIA) 100 MG tablet Take 1 tablet (100 mg total) by mouth daily. With or without food 30 tablet 6     No current facility-administered medications for  "this visit.        ROS:   10 point review of systems positive as discussed above otherwise negative    Objective:    /80 (Patient Site: Left Arm, Patient Position: Sitting, Cuff Size: Adult Regular)   Pulse 72   Temp 97.9  F (36.6  C) (Oral)   Ht 4' 7.88\" (1.419 m)   Wt 117 lb (53.1 kg)   LMP  (LMP Unknown)   SpO2 98%   BMI 26.35 kg/m    Body mass index is 26.35 kg/m .      General appearance no acute distress    HEENT neck without JVD oropharynx is clear pupils react normally    Lungs are clear no rales or rhonchi heart was regular S1-S2.    Abdomen is soft nontender no guarding or rebound.    Lower extremities without edema normal sensation.  No skin rashes.    Labs today include lipid AST ALT microalbumin and A1c.    Referral for Cologuard    Referral to diabetic education        Assessment:  1. Type 2 diabetes mellitus with microalbuminuria, without long-term current use of insulin (H)  Glycosylated Hemoglobin A1c    Microalbumin, Random Urine    Ambulatory referral to Diabetes Education Program (CDE)   2. Hyperlipidemia, unspecified hyperlipidemia type  Lipid Cascade FASTING    ALT (SGPT)    AST (SGOT)   3. Microalbuminuria     4. Special screening for malignant neoplasms, colon  Cologuard     As discussed above regarding diabetes await diabetic education for her to start checking blood sugars.  I will contact her regarding lab results plan to recheck in 3 months    Hyperlipidemia on atorvastatin 20 mg a day no side effects.  Await lipid AST ALT.    Microalbuminuria.  On losartan blood pressure looks good await recheck microalbumin.    Healthcare maintenance, Cologuard referral    Plan: As discussed above    This transcription uses voice recognition software, which may contain typographical errors.  "

## 2021-06-06 NOTE — TELEPHONE ENCOUNTER
Spoke with patient's daughter, Amish (on CTC). Informed patient's daughter that device has been sent to pharmacy.

## 2021-06-06 NOTE — PROGRESS NOTES
Assessment:   --Ruben and daughter Amish attended visit today for DM education, per patient, she had not received education for her DM in the past.  --she has started going to the gym this past week, at this point, she is only using the sauna, no planned exercise  --consuming three meals/day:  1-1.5 cups brown rice or noodles, lean meats, vegetables, has once piece fruit daily  --has 8 oz fruit juice before am meal, hot water or unsweetened tea during the day, no other sweetened beverages.    Blood glucose record:  Accu check guide meter set up in clinic today, daughter Amish was present and learned the meter, she had no questions.    Medications:  Glipizide 10 mg once daily  Januvia 100 mg once daily    Plan:   1. Check glucose once daily: fasting or two hours after a meal.  2. Add in 5-10 minutes of exercise at the gym(walking/biking) before using the sauna  3. Decrease juice intake to 4oz/day or eliminate  4. Limit rice and noodle portions to one cup, have fruit between meals.  5. Follow up with educator on 3/25/20.    Subjective and Objective:      Ruben Cisneros is referred by Emiliano Zimmer for Diabetes Education.     Lab Results   Component Value Date    HGBA1C 7.2 (H) 02/04/2020     Wt Readings from Last 3 Encounters:   02/20/20 117 lb 8 oz (53.3 kg)   02/04/20 117 lb (53.1 kg)   10/29/19 112 lb 8 oz (51 kg)                       Education:     Monitoring   Meter (per above goals): Assessed and Discussed  Monitoring: Assessed and Discussed  BG goals: Assessed and Discussed    Nutrition Management  Nutrition Management: Assessed, Discussed and Literature provided  Weight: Assessed  Portions/Balance: Assessed, Discussed and Literature provided  Physical Activity: Assessed and Discussed  Medications: Assessed and Discussed  Orals: Assessed and Discussed    Diabetes Disease Process: Assessed and Discussed    Acute Complications: Prevent, Detect, Treat:  Hypoglycemia: Assessed  Hyperglycemia: Assessed  Sick Days:  Assessed  Driving: Not addressed    Chronic Complications  Foot Care:Not addressed  Skin Care: Not addressed  Eye: Not addressed  ABC: Assessed  Teeth:Not addressed  Goal Setting and Problem Solving: Assessed and Discussed  Barriers: Assessed  Psychosocial Adjustments: Assessed and Discussed      Time spent with the patient: 60 minutes for diabetes education and counseling.   Previous Education: no  Visit Type:DSMT  Hours Remainin, DSMT 1 and MNT 2      Arlene Conteh  2020

## 2021-06-06 NOTE — TELEPHONE ENCOUNTER
"Spoke with pharmacy, they did not receive order. This was sent in epic as \"OTC\", not sent electronically. Attempted to give verbal, pharmacy denied due to ICD-10 needed electronically. Please resend through epic.  "

## 2021-06-06 NOTE — TELEPHONE ENCOUNTER
Medication Request  Medication name: Accu-Chek Fastclix Lancet Drum test blood sugar once daily    Accu-Chek Guide test strip test blood sugar once daily.    Requested Pharmacy: [facility]  Reason for request: Fax request received from pharmacy.    Okay to leave a detailed message: yes

## 2021-06-06 NOTE — PATIENT INSTRUCTIONS - HE
Check blood sugar once daily: in the morning before eating or two hours after a meal  Decrease or eliminate juice intake  Add in 5-10 minutes of exercise at the gym before using the sauna  Limit rice and noodles to one cup servings, have fruit  Between meals.

## 2021-06-06 NOTE — TELEPHONE ENCOUNTER
(refer to Chart note, telephone call 2/28/20)    Patient's daughter went to [facility], was able to  the test strips, but Pharmacy states they did not receive an order for the Accu-Chek Fastclix Lancet Drum. Please place order so patient can test blood sugars.     Carolee, PSR

## 2021-06-07 NOTE — PROGRESS NOTES
CONVERTED TO PHONE VISIT/30 MINUTES    Assessment:   --spoke with Geno and daughter Amish, through  [facility] for follow up today.  --daughter Amish has been checking Geno's glucose, no issues with the meter  --daughter Amish did report that Geno did not take her medication for a couple of days, and that she is out of Glipizide(patient does have refills and Amish will  prescription today)   --geno stated that she had some GI discomfort when taking the medications and that is why she stopped, I encouraged her to take with food and report continuing symptoms to her PCP  --no gym access right now, patient walking in her home  --she has cut down to a smaller glass for her juice and has decreased her portions of rice and noodles  --patient consuming 3-4 glasses of water/day    Blood glucose record:  FBS: 188,166,189,197,199(days when patient was not taking her medications),150,131,162  Post meal: 147    Medications:  Glipizide 10 mg/day  Januvia 100 mg day    Education:  --reviewed BG, goals for BG, medication compliance  --reviewed options for activity, benefits, nutrition, portion control    Plan:   1. Test glucose once daily: fasting or two hours post meal  2. Take  Medications as prescribed  3. Limit rice/noodles at meals to no more than one cup.   4. Follow up with educator August 19th, 2020.     Subjective and Objective:      Geno Cisneros is referred by Emiliano Zimmer for Diabetes Education.     Lab Results   Component Value Date    HGBA1C 7.2 (H) 02/04/2020           Education:     Monitoring   Meter (per above goals): VIRTUAL VISIT  Monitoring: Assessed and Discussed  BG goals: Assessed and Discussed    Nutrition Management  Nutrition Management: Assessed and Discussed  Weight: not in clinic  Portions/Balance: Assessed and Discussed    Physical Activity: Assessed and Discussed  Medications: Assessed and Discussed  Orals: Assessed and Discussed     Diabetes Disease Process: Assessed    Acute Complications:  Prevent, Detect, Treat:  Hypoglycemia: Assessed  Hyperglycemia: Assessed and Discussed  Sick Days: Assessed  Driving: Not addressed    Chronic Complications  Foot Care:Not addressed  Skin Care: Not addressed  Eye: Not addressed  ABC: Assessed  Teeth:Not addressed  Goal Setting and Problem Solving: Assessed and Discussed  Barriers: Assessed and Discussed  Psychosocial Adjustments: Assessed and Discussed      Time spent with the patient: 30 minutes for diabetes education and counseling.   Previous Education: yes  Visit Type:DSMT  Hours Remainin, DSMT .5 and MNT 2      Arlene Conteh  3/25/2020

## 2021-06-07 NOTE — PROGRESS NOTES
"Ruben Cisneros is a 65 y.o. female who is being evaluated via a billable video visit.      The patient has been notified of following:     \"This video visit will be conducted via a call between you and your physician/provider. We have found that certain health care needs can be provided without the need for an in-person physical exam.  This service lets us provide the care you need with a video conversation.  If a prescription is necessary we can send it directly to your pharmacy.  If lab work is needed we can place an order for that and you can then stop by our lab to have the test done at a later time.    Video visits are billed at different rates depending on your insurance coverage. Please reach out to your insurance provider with any questions.    If during the course of the call the physician/provider feels a video visit is not appropriate, you will not be charged for this service.\"    Patient has given verbal consent to a Video visit? Yes        Patient would like the video invitation sent by: Text to cell phone: [phone]    Will anyone else be joining your video visit? No        Video Start Time: 8:24 am    Additional provider notes:   Subjective: This was a telephone visit because of the coronavirus pandemic.    Patient has diabetes hyperlipidemia microalbumin urea    Previously had a lumbar burst fracture she seems to be stable regarding that still has some achiness at times through the muscles but otherwise stable.  We discussed using Tylenol avoid NSAIDs.    Patient's last labs showed A1c 7.2 LDL 91 normal liver function she is on atorvastatin 20 mg a day    Her diabetes glipizide and sitagliptin    Blood sugars average 160.    These labs were done in February.    Denies any numbness in the feet vision is been okay    Patient had insufficient stool for the Cologuard check will need to recheck that down the line    Discussed holding off on coming into the clinic at this time since she seems to be stable.  We " had increased her glipizide previously she is on 1 twice daily sugars are in the 1 30-1 60 range.    Patient has no additional problems and meds were reviewed.    He does take losartan low-dose for the microalbumin.    Denies any COVID-19 symptoms no chest pain shortness of breath no bowel or bladder issues no fevers.  She has been following recommended restrictions regarding COVID-19    Tobacco status: She  reports that she has never smoked. She has never used smokeless tobacco.    Patient Active Problem List    Diagnosis Date Noted     Hyperlipidemia, unspecified hyperlipidemia type 07/22/2019     Microalbuminuria 12/04/2018     Lumbar burst fracture (H) 11/10/2018     Diabetes mellitus, type 2 (H) 11/10/2018     Abnormality of gait 09/02/2008       Current Outpatient Medications   Medication Sig Dispense Refill     acetaminophen (TYLENOL EXTRA STRENGTH) 500 MG tablet 1-2 po three times a day prn pain 60 tablet 1     atorvastatin (LIPITOR) 20 MG tablet Take 1 tablet (20 mg total) by mouth daily. 30 tablet 3     blood glucose test (ACCU-CHEK GUIDE) strips Use 1 each As Directed as needed. Dispense brand per patient's insurance at pharmacy discretion. 50 strip 6     generic lancets Use 1 each As Directed as needed. Dispense brand per patient's insurance at pharmacy discretion. 50 each 6     glipiZIDE (GLUCOTROL) 10 MG tablet 1 p.o. every morning and one half p.o. every afternoon with food 45 tablet 3     lancing device with lancets (ACCU-CHEK FASTCLIX LANCING DEV) Kit Check blood sugar once daily 100 each 3     losartan (COZAAR) 25 MG tablet Take 1 tablet (25 mg total) by mouth daily. 30 tablet 3     SITagliptin (JANUVIA) 100 MG tablet Take 1 tablet (100 mg total) by mouth daily. With or without food 30 tablet 3     No current facility-administered medications for this visit.        ROS:   10 point review of systems positive as discussed above otherwise negative    Objective:    LMP  (LMP Unknown)   There is no  height or weight on file to calculate BMI.      General appearance no acute distress    HEENT oropharynx appears normal she has supple neck her eyes without scleral icterus    Lungs she feels are clear there is no wheezes normal breathing, normal conversation    Denies any palpitations    Lower extremities without edema feet are okay denies any numbness.  There is no ulcerations.    Denies any abdominal pain on palpation.    Lab work as discussed    Results for orders placed or performed in visit on 02/04/20   Lipid [place] FASTING   Result Value Ref Range    Cholesterol 161 <=199 mg/dL    Triglycerides 180 (H) <=149 mg/dL    HDL Cholesterol 34 (L) >=50 mg/dL    LDL Calculated 91 <=129 mg/dL    Patient Fasting > 8hrs? Yes    Glycosylated Hemoglobin A1c   Result Value Ref Range    Hemoglobin A1c 7.2 (H) 3.5 - 6.0 %   Microalbumin, Random Urine   Result Value Ref Range    Microalbumin, Random Urine 0.99 0.00 - 1.99 mg/dL    Creatinine, Urine 53.7 mg/dL    Microalbumin/Creatinine Ratio Random Urine 18.4 <=19.9 mg/g   ALT (SGPT)   Result Value Ref Range    ALT 13 0 - 45 U/L   AST (SGOT)   Result Value Ref Range    AST 15 0 - 40 U/L       Assessment:  1. Type 2 diabetes mellitus with microalbuminuria, without long-term current use of insulin (H)  glipiZIDE (GLUCOTROL) 10 MG tablet    SITagliptin (JANUVIA) 100 MG tablet   2. Hyperlipidemia, unspecified hyperlipidemia type  atorvastatin (LIPITOR) 20 MG tablet   3. Microalbuminuria  losartan (COZAAR) 25 MG tablet   4. Closed burst fracture of lumbar vertebra, sequela     5. Special screening for malignant neoplasms, colon       Diabetes mellitus seems to be under control continue the glipizide and sitagliptin    Lipids controlled with atorvastatin    On low-dose losartan for microalbumin blood pressures been fine.    We will need Cologuard again, when she comes in for labs she could pick that up.  Low back pain is controlled continue to do stretching exercises, healed burst  fracture    Plan: As outlined above recheck 6 weeks, will plan to check lab only after that    This transcription uses voice recognition software, which may contain typographical errors.      Video-Visit Details    Type of service:  Video Visit    Video End Time (time video stopped): 8:37 AM  Originating Location (pt. Location): Home    Distant Location (provider location):  [facility]     Platform used for Video Visit: Hue Zimmer MD

## 2021-06-08 ENCOUNTER — COMMUNICATION - HEALTHEAST (OUTPATIENT)
Dept: FAMILY MEDICINE | Facility: CLINIC | Age: 66
End: 2021-06-08

## 2021-06-08 DIAGNOSIS — E11.29 TYPE 2 DIABETES MELLITUS WITH MICROALBUMINURIA, WITHOUT LONG-TERM CURRENT USE OF INSULIN (H): ICD-10-CM

## 2021-06-08 DIAGNOSIS — R80.9 TYPE 2 DIABETES MELLITUS WITH MICROALBUMINURIA, WITHOUT LONG-TERM CURRENT USE OF INSULIN (H): ICD-10-CM

## 2021-06-08 NOTE — PROGRESS NOTES
"Ruben Cisneros is a 65 y.o. female who is being evaluated via a billable video visit.      The patient has been notified of following:     \"This video visit will be conducted via a call between you and your physician/provider. We have found that certain health care needs can be provided without the need for an in-person physical exam.  This service lets us provide the care you need with a video conversation.  If a prescription is necessary we can send it directly to your pharmacy.  If lab work is needed we can place an order for that and you can then stop by our lab to have the test done at a later time.    Video visits are billed at different rates depending on your insurance coverage. Please reach out to your insurance provider with any questions.    If during the course of the call the physician/provider feels a video visit is not appropriate, you will not be charged for this service.\"    Patient has given verbal consent to a Video visit? Yes    How would you like to obtain your AVS? AVS Preference: Mail a copy.  Patient would like the video invitation sent by: Text to cell phone: [phone]    Will anyone else be joining your video visit? No        Video Start Time: 8::25 am    Additional provider notes:     Subjective: This was a virtual visit, video, because of the coronavirus pandemic.    This 65-year-old female has diabetes she is on glipizide 10 mg 1 in the morning and 1/2 tablet in the p.m. also sitagliptin 100 mg daily.  Last A1c 7.2, blood sugars average 130.    Patient is also on atorvastatin for hyperlipidemia 20 mg daily.  She does have microalbuminuria and takes losartan low-dose 25 mg a day.  Last blood pressure 120/80.    Back in February LDL 91 normal liver function at that point microalbumin was negative.  She had positive microalbumin previously.    She has no COVID-19 symptoms denies any fever cough congestion chest pain denies any loss of sense of smell etc.    She does get some symptoms of some " stomach pain after taking her medications.    It happens about 1/2-hour to 1 hour after.    We discussed additional medication such as omeprazole but she wanted to hold off on that.    She is going to come in for lab only A1c and BMP.    Tobacco status: She  reports that she has never smoked. She has never used smokeless tobacco.    Patient Active Problem List    Diagnosis Date Noted     Hyperlipidemia, unspecified hyperlipidemia type 07/22/2019     Microalbuminuria 12/04/2018     Lumbar burst fracture (H) 11/10/2018     Diabetes mellitus, type 2 (H) 11/10/2018     Abnormality of gait 09/02/2008       Current Outpatient Medications   Medication Sig Dispense Refill     acetaminophen (TYLENOL EXTRA STRENGTH) 500 MG tablet 1-2 po three times a day prn pain 60 tablet 1     atorvastatin (LIPITOR) 20 MG tablet Take 1 tablet (20 mg total) by mouth daily. 30 tablet 3     blood glucose test (ACCU-CHEK GUIDE) strips Use 1 each As Directed as needed. Dispense brand per patient's insurance at pharmacy discretion. 50 strip 6     generic lancets Use 1 each As Directed as needed. Dispense brand per patient's insurance at pharmacy discretion. 50 each 6     glipiZIDE (GLUCOTROL) 10 MG tablet 1 p.o. every morning and one half p.o. every afternoon with food 45 tablet 3     lancing device with lancets (ACCU-CHEK FASTCLIX LANCING DEV) Kit Check blood sugar once daily 100 each 3     losartan (COZAAR) 25 MG tablet Take 1 tablet (25 mg total) by mouth daily. 30 tablet 3     SITagliptin (JANUVIA) 100 MG tablet Take 1 tablet (100 mg total) by mouth daily. With or without food 30 tablet 3     No current facility-administered medications for this visit.        ROS:   10 point review of systems positive as outlined above otherwise negative, denies any low blood sugar readings    Objective:    LMP  (LMP Unknown)   There is no height or weight on file to calculate BMI.    General appearance no acute distress    HEENT oropharynx well-hydrated no  "scleral icterus.    Denies any visual changes    Lungs, no labored breathing, no wheezing by history    Heart: No racing of the heart or palpitations by history.    There is no abdominal pain when she presses on the abdomen    Lower extremities no edema    No rashes.    Denies any significant numbness or tingling in her feet.  No sores in the lower legs.    We will have the patient come in for A1c and BMP as a lab only    Results for orders placed or performed in visit on 02/04/20   Lipid [place] FASTING   Result Value Ref Range    Cholesterol 161 <=199 mg/dL    Triglycerides 180 (H) <=149 mg/dL    HDL Cholesterol 34 (L) >=50 mg/dL    LDL Calculated 91 <=129 mg/dL    Patient Fasting > 8hrs? Yes    Glycosylated Hemoglobin A1c   Result Value Ref Range    Hemoglobin A1c 7.2 (H) 3.5 - 6.0 %   Microalbumin, Random Urine   Result Value Ref Range    Microalbumin, Random Urine 0.99 0.00 - 1.99 mg/dL    Creatinine, Urine 53.7 mg/dL    Microalbumin/Creatinine Ratio Random Urine 18.4 <=19.9 mg/g   ALT (SGPT)   Result Value Ref Range    ALT 13 0 - 45 U/L   AST (SGOT)   Result Value Ref Range    AST 15 0 - 40 U/L       Assessment:  1. Type 2 diabetes mellitus with microalbuminuria, without long-term current use of insulin (H)  Basic Metabolic Panel    Glycosylated Hemoglobin A1c   2. Hyperlipidemia, unspecified hyperlipidemia type     3. Microalbuminuria     4. Abdominal pain, epigastric       Patient will be contacted regarding the lab results for now stay on the same dose of medication.    Consider omeprazole for her epigastric symptoms \"stomach pain \".    Continue on low-dose losartan for microalbuminuria.    Hypertension has been controlled can check blood pressure when she comes in as well.    Hyperlipidemia with last LDL 91 continue on atorvastatin 20 mg a day    Plan: As outlined above    This transcription uses voice recognition software, which may contain typographical errors.      Video-Visit Details    Type of " service:  Video Visit     Video End Time (time video stopped): 8:38 AM  Originating Location (pt. Location): Home    Distant Location (provider location):  [facility]     Platform used for Video Visit: Hue Zimmer MD

## 2021-06-09 NOTE — PROGRESS NOTES
Called and spoke with geno cisneros daughter in [facility]. Message was given. No other questions. she understood the message.

## 2021-06-09 NOTE — TELEPHONE ENCOUNTER
Patient has a future F2F appointment on 07/02/2020 @ 9:40am  with the lab and an appt scheduled for 09/21/2020 @ 8:20am with Dr. Zimmer for a 3 month follow up. Completing task.

## 2021-06-11 NOTE — PROGRESS NOTES
"Ruben Cisneros is a 65 y.o. female who is being evaluated via a billable video visit.      The patient has been notified of following:     \"This video visit will be conducted via a call between you and your physician/provider. We have found that certain health care needs can be provided without the need for an in-person physical exam.  This service lets us provide the care you need with a video conversation.  If a prescription is necessary we can send it directly to your pharmacy.  If lab work is needed we can place an order for that and you can then stop by our lab to have the test done at a later time.    Video visits are billed at different rates depending on your insurance coverage. Please reach out to your insurance provider with any questions.    If during the course of the call the physician/provider feels a video visit is not appropriate, you will not be charged for this service.\"    Patient has given verbal consent to a Video visit? Yes  How would you like to obtain your AVS? AVS Preference: Mail a copy.  If dropped by the video visit, the video invitation should be sent to: Text to cell phone: [phone]   Will anyone else be joining your video visit? No        Video Start Time: 8:31 am    Additional provider notes:     Subjective: This patient had a virtual visit, video, due to the coronavirus pandemic.  She was present along with her daughter.    Also an     Patient had a virtual visit 3 months ago for diabetes hyper lipidemia and microalbumin urea    She continues on same meds of sitagliptin glipizide for diabetes also atorvastatin for lipids and losartan for microalbumin.    Her blood pressures been normal    Last A1c 5.8 BMP normal 3 months ago.    LDL in February was 91 she said normal liver test.    She did have a fall and had a radius (distal radial) fracture and ulnar styloid on 7/31/2020 she is followed with the orthopedist and was last there on 9/16/2020.  She states that she is feeling " well everything is healed up now    She also has had a previous wrist fracture and a previous lumbar vertebral fracture.    I ordered a bone density.  Also vitamin D level.    I do not think she is on any calcium or vitamin D will check when we call her back on that.    No additional concerns or issues    No COVID-19 symptoms or exposures.    She is due for a flu shot    Tobacco status: She  reports that she has never smoked. She has never used smokeless tobacco.    Patient Active Problem List    Diagnosis Date Noted     Hyperlipidemia, unspecified hyperlipidemia type 07/22/2019     Microalbuminuria 12/04/2018     Lumbar burst fracture (H) 11/10/2018     Diabetes mellitus, type 2 (H) 11/10/2018     Abnormality of gait 09/02/2008       Current Outpatient Medications   Medication Sig Dispense Refill     acetaminophen (TYLENOL EXTRA STRENGTH) 500 MG tablet 1-2 po three times a day prn pain 60 tablet 1     atorvastatin (LIPITOR) 20 MG tablet Take 1 tablet (20 mg total) by mouth daily. 30 tablet 3     blood glucose test (ACCU-CHEK GUIDE) strips Use 1 each As Directed as needed. Dispense brand per patient's insurance at pharmacy discretion. 50 strip 6     generic lancets Use 1 each As Directed as needed. Dispense brand per patient's insurance at pharmacy discretion. 50 each 6     glipiZIDE (GLUCOTROL) 10 MG tablet 1 p.o. every morning and one half p.o. every afternoon with food 45 tablet 3     lancing device with lancets (ACCU-CHEK FASTCLIX LANCING DEV) Kit Check blood sugar once daily 100 each 3     losartan (COZAAR) 25 MG tablet Take 1 tablet (25 mg total) by mouth daily. 30 tablet 3     SITagliptin (JANUVIA) 100 MG tablet Take 1 tablet (100 mg total) by mouth daily. With or without food 30 tablet 3     No current facility-administered medications for this visit.        ROS:   10 point review of systems positive as discussed above otherwise negative    Objective:    No vital signs were done    HEENT: No congestion  through the nose no sore throat    No adenopathy    Lungs: Nonlabored breathing no wheezing    Heart: No palpitations or rapid heart rate    Extremities, denies edema    No numbness through the feet no ulcerations.    No focal weakness or numbness    Joints without redness warmth or swelling.    Patient will come in for lab only with A1c should get a flu shot then    We will also get a bone density.          Results for orders placed or performed in visit on 07/02/20   Basic Metabolic Panel   Result Value Ref Range    Sodium 137 136 - 145 mmol/L    Potassium 3.8 3.5 - 5.0 mmol/L    Chloride 105 98 - 107 mmol/L    CO2 22 22 - 31 mmol/L    Anion Gap, Calculation 10 5 - 18 mmol/L    Glucose 114 70 - 125 mg/dL    Calcium 9.0 8.5 - 10.5 mg/dL    BUN 12 8 - 22 mg/dL    Creatinine 0.61 0.60 - 1.10 mg/dL    GFR MDRD Af Amer >60 >60 mL/min/1.73m2    GFR MDRD Non Af Amer >60 >60 mL/min/1.73m2   Glycosylated Hemoglobin A1c   Result Value Ref Range    Hemoglobin A1c 5.8 3.5 - 6.0 %       Assessment:  1. Type 2 diabetes mellitus with microalbuminuria, without long-term current use of insulin (H)  Glycosylated Hemoglobin A1c   2. Hyperlipidemia, unspecified hyperlipidemia type     3. Microalbuminuria     4. Closed fracture of distal end of right radius, unspecified fracture morphology, sequela  DXA Bone Density Scan   5. Closed burst fracture of lumbar vertebra with routine healing, subsequent encounter  DXA Bone Density Scan   6. Vitamin D deficiency  Vitamin D, Total (25-Hydroxy)     Diabetes mellitus, blood sugars averaging around 120 should be under control await A1c stay on glipizide and sitagliptin.    Hyperlipidemia, continue atorvastatin last LDL 91 in February    Microalbumin urea, continue on losartan.    History of radius fractures and lumbar fracture check bone density check vitamin D level.    No evidence of COVID-19    Plan: As outlined above should be contacted with results    This transcription uses voice  recognition software, which may contain typographical errors.      Video-Visit Details    Type of service:  Video Visit    Video End Time (time video stopped): 8:42 AM  Originating Location (pt. Location): Home    Distant Location (provider location):  [facility]/OB     Platform used for Video Visit: Hue Zimmer MD

## 2021-06-12 NOTE — TELEPHONE ENCOUNTER
----- Message from Emiliano Zimmer MD sent at 10/1/2020  4:56 PM CDT -----  Please contact this patient, let her know that her vitamin D level was low.  She needs to start on vitamin D 2000 units, 1 pill daily.  I sent a prescription to her pharmacy.    The A1c regarding diabetes look good at 5.8.  Stay on the same diabetic medication.    Please schedule her for a follow-up virtual visit with me in early January.  We will recheck diabetes and recheck vitamin D level after that.

## 2021-06-12 NOTE — TELEPHONE ENCOUNTER
Relayed lab results to the patient's daughter Amish. Consent form is in the patient's chart. No further questions.

## 2021-06-14 NOTE — TELEPHONE ENCOUNTER
----- Message from Emiliano Zimmer MD sent at 1/9/2021  9:24 AM CST -----  Please contact this patient, let her know that the vitamin D level was still quite low at 10.4.  I would like her to increase her vitamin D from 2000 units once a day up to 2 tablets a day.  I sent a new prescription to her pharmacy    She also needs to go on calcium

## 2021-06-14 NOTE — PROGRESS NOTES
"Ruben Cisneros is a 65 y.o. female who is being evaluated via a billable telephone visit.      The patient has been notified of following:     \"This telephone visit will be conducted via a call between you and your physician/provider. We have found that certain health care needs can be provided without the need for a physical exam.  This service lets us provide the care you need with a short phone conversation.  If a prescription is necessary we can send it directly to your pharmacy.  If lab work is needed we can place an order for that and you can then stop by our lab to have the test done at a later time.    Telephone visits are billed at different rates depending on your insurance coverage. During this emergency period, for some insurers they may be billed the same as an in-person visit.  Please reach out to your insurance provider with any questions.    If during the course of the call the physician/provider feels a telephone visit is not appropriate, you will not be charged for this service.\"    Patient has given verbal consent to a Telephone visit? Yes    What phone number would you like to be contacted at? [phone]    Patient would like to receive their AVS by AVS Preference: Mail a copy.    Additional provider notes:     Subjective: This patient had a virtual visit, telephone, due to the coronavirus pandemic.    Patient has low vitamin D she is now on 2000 units daily.  Her vitamin D was 18.3 in September.  At that time A1c was 5.8    We are going to check vitamin D level and A1c.    Meds include sitagliptin 100 mg a day as well as glipizide 10 g in the morning and 5 mg in the p.m.  Blood sugars are in the 130 range.    She continues on losartan and atorvastatin also.  And vitamin D.    She feels she has been healthy her daughter sets up her medicines.    Does have underlying history of microalbumin urea in addition to the diabetes.    No COVID-19 symptoms or exposure    Tobacco status: She  reports that she " has never smoked. She has never used smokeless tobacco.    Patient Active Problem List    Diagnosis Date Noted     Hyperlipidemia, unspecified hyperlipidemia type 07/22/2019     Microalbuminuria 12/04/2018     Lumbar burst fracture (H) 11/10/2018     Diabetes mellitus, type 2 (H) 11/10/2018     Abnormality of gait 09/02/2008       Current Outpatient Medications   Medication Sig Dispense Refill     acetaminophen (TYLENOL EXTRA STRENGTH) 500 MG tablet 1-2 po three times a day prn pain 60 tablet 1     atorvastatin (LIPITOR) 20 MG tablet Take 1 tablet (20 mg total) by mouth daily. 30 tablet 3     blood glucose test (ACCU-CHEK GUIDE) strips Use 1 each As Directed as needed. Dispense brand per patient's insurance at pharmacy discretion. 50 strip 6     cholecalciferol, vitamin D3, 50 mcg (2,000 unit) Tab 1 p.o. daily 90 tablet 1     generic lancets Use 1 each As Directed as needed. Dispense brand per patient's insurance at pharmacy discretion. 50 each 6     glipiZIDE (GLUCOTROL) 10 MG tablet TAKE 1 TABLET BY MOUTH EVERY MORNING AND 1/2 TABLET BY MOUTH EVERY AFTERNOON WITH FOOD 45 tablet 2     lancing device with lancets (ACCU-CHEK FASTCLIX LANCING DEV) Kit Check blood sugar once daily 100 each 3     losartan (COZAAR) 25 MG tablet Take 1 tablet (25 mg total) by mouth daily. 30 tablet 3     SITagliptin (JANUVIA) 100 MG tablet Take 1 tablet (100 mg total) by mouth daily. With or without food 30 tablet 3     No current facility-administered medications for this visit.        ROS:   10 point review of systems positive as outlined above otherwise negative    Objective:    LMP  (LMP Unknown)   There is no height or weight on file to calculate BMI.      General: No acute distress    HEENT: No nasal congestion no visual changes    Lungs: Nonlabored breathing no wheezing.    Heart: No palpitations or rapid heart rate.    Lower extremities without edema no atrophic changes to the feet normal sensation.    Abdomen nontender bowels  normal she does have an upcoming colonoscopy in January.    Results for orders placed or performed in visit on 09/29/20   Glycosylated Hemoglobin A1c   Result Value Ref Range    Hemoglobin A1c 5.8 (H) <=5.6 %   Vitamin D, Total (25-Hydroxy)   Result Value Ref Range    Vitamin D, Total (25-Hydroxy) 18.3 (L) 30.0 - 80.0 ng/mL       Assessment:  1. Type 2 diabetes mellitus with microalbuminuria, without long-term current use of insulin (H)  Glycosylated Hemoglobin A1c   2. Hyperlipidemia, unspecified hyperlipidemia type     3. Microalbuminuria     4. Vitamin D deficiency  Vitamin D, Total (25-Hydroxy)     Diabetes mellitus continue on the sitagliptin and glipizide.    Hyperlipidemia we will need to check lipids and 3 months    Vitamin D deficiency recheck vitamin D level she is on 2000 units daily.    Continue same other meds    Monitor for any COVID-19 symptoms    Plan to recheck in approximately 3 months.  Patient contacted with lab results        Plan: As outlined above    This transcription uses voice recognition software, which may contain typographical errors.      Phone call duration: 13 minutes from 8:19 AM through 8:32 PM    Emiliano Zimmer MD

## 2021-06-14 NOTE — PROGRESS NOTES
Called and left message#1 for patient to return call to clinic for test results. Okay to relay message.

## 2021-06-16 PROBLEM — R80.9 MICROALBUMINURIA: Status: ACTIVE | Noted: 2018-12-04

## 2021-06-16 PROBLEM — E78.5 HYPERLIPIDEMIA, UNSPECIFIED HYPERLIPIDEMIA TYPE: Status: ACTIVE | Noted: 2019-07-22

## 2021-06-16 PROBLEM — E11.9 DIABETES MELLITUS, TYPE 2 (H): Status: ACTIVE | Noted: 2018-11-10

## 2021-06-16 PROBLEM — S32.001A LUMBAR BURST FRACTURE (H): Status: ACTIVE | Noted: 2018-11-10

## 2021-06-16 NOTE — TELEPHONE ENCOUNTER
FABI through [facility]  to have patient return call  Charlene Hernandez MA

## 2021-06-16 NOTE — PROGRESS NOTES
Ruben Cisneros is a 66 y.o. female who is being evaluated via a billable telephone visit.      What phone number would you like to be contacted at? [phone]   How would you like to obtain your AVS? AVS Preference: Mail a copy.    Assessment & Plan     Ruben was seen today for follow-up.    Diagnoses and all orders for this visit:    Type 2 diabetes mellitus with microalbuminuria, without long-term current use of insulin (H)  -     glipiZIDE (GLUCOTROL) 10 MG tablet; TAKE 1 TABLET BY MOUTH EVERY MORNING AND 1/2 TABLET BY MOUTH EVERY AFTERNOON WITH FOOD  -     SITagliptin (JANUVIA) 100 MG tablet; Take 1 tablet (100 mg total) by mouth daily. With or without food  -     Glycosylated Hemoglobin A1c; Future  -     Microalbumin, Random Urine; Future    Hyperlipidemia, unspecified hyperlipidemia type  -     atorvastatin (LIPITOR) 20 MG tablet; Take 1 tablet (20 mg total) by mouth daily.  -     Lipid Keith FASTING; Future  -     AST (SGOT); Future  -     ALT (SGPT); Future    Microalbuminuria  -     losartan (COZAAR) 25 MG tablet; Take 1 tablet (25 mg total) by mouth daily.    Osteoporosis, unspecified osteoporosis type, unspecified pathological fracture presence  -     calcium carbonate (OS-ANTIONE) 600 mg calcium (1,500 mg) tablet; Take 1 tablet (600 mg total) by mouth 2 (two) times a day with meals.  -     alendronate (FOSAMAX) 70 MG tablet; Take 1 tablet (70 mg total) by mouth every 7 days. Take in the morning on an empty stomach with a full glass of water 30 minutes before food  -     cholecalciferol, vitamin D3, 50 mcg (2,000 unit) Tab; 2 p.o. daily    Vitamin deficiency  -     cholecalciferol, vitamin D3, 50 mcg (2,000 unit) Tab; 2 p.o. daily  -     Vitamin D, Total (25-Hydroxy); Future    Colon cancer screening  -     Cologuard; Future    Visit for screening mammogram  -     Mammo Screening Bilateral; Future            Patient has diabetes seems to be under control continue same medicines check A1c    Hyperlipidemia on  atorvastatin no side effects check AST ALT and lipid    Microalbuminuria on losartan recheck microalbumin.    Need for screening mammogram referral was placed    Need for colon cancer screening, Cologuard referral.    Osteoporosis make sure she takes Fosamax 70 mg daily as well as her calcium and vitamin D    Vitamin D deficiency now on 2000 units 2 a day, recheck vitamin D level.    Patient should be contacted with results and discuss follow-up        Review of the result(s) of each unique test - I reviewed previous mammogram from 2 years ago, will schedule a another mammogram  Assessment requiring an independent historian(s) - family - Daughter is independent historian  Diagnosis or treatment significantly limited by social determinants of health - Non-English-speaking immigrant      24 minutes spent on the date of the encounter doing chart review, history and exam, documentation and further activities as noted above       Return in about 3 months (around 6/25/2021) for Recheck.    Emiliano Zimmer MD  [facility]    Subjective   Ruben Cisneros is 66 y.o. and presents today for the following health issues   HPI   This patient had a virtual visit, telephone, due to the coronavirus pandemic.  She was present as well as her daughter, alex.    Patient has diabetes hyperlipidemia vitamin D deficiency.  She has microalbumin urea and she is on losartan atorvastatin.    Also is on glipizide 10 mg 1 in the morning half at afternoon and Januvia 100 mg 1 a day    Blood sugars are in the 120 range.    She needs a follow-up A1c as well as lipid AST and ALT    Her vitamin D in January was 10.4 I increased her vitamin D up to 2000 units 2 a day.  We will recheck vitamin D    She has evidence of osteoporosis on bone density.  She is now taking Fosamax 70 mg once a week sometimes she misses I encouraged her to take that once a week.  Also calcium 600 mg twice a day    Her daughter sets out the medicine    She  otherwise feels well no concerns or problems    Her daughter is in the process of getting her set up for COVID-19 vaccinations.    Review of Systems  10 point review of systems positive as outlined above otherwise negative      Objective       Vitals:  No vitals were obtained today due to virtual visit.    Physical Exam  General: No acute distress    HEENT no nasal congestion or sore throat or cough.    Lungs: Nonlabored breathing no wheezing.    Heart: No palpitations or rapid heart rate    Skin normal no rashes    No peripheral edema    No numbness or atrophic changes through the lower extremity.            Phone call duration: 11 minutes

## 2021-06-16 NOTE — PROGRESS NOTES
This encounter was created solely for the purpose of releasing the future order that was placed for Cologuard.  This is a necessary step in order for the results to be abstracted once they are available.    Maynor Cunha

## 2021-06-16 NOTE — TELEPHONE ENCOUNTER
Patients daughter called back and I relayed the message, I also changed the Roxi appt from a phone visit to face to face.

## 2021-06-17 ENCOUNTER — OFFICE VISIT - HEALTHEAST (OUTPATIENT)
Dept: FAMILY MEDICINE | Facility: CLINIC | Age: 66
End: 2021-06-17

## 2021-06-17 DIAGNOSIS — E78.5 HYPERLIPIDEMIA, UNSPECIFIED HYPERLIPIDEMIA TYPE: ICD-10-CM

## 2021-06-17 DIAGNOSIS — I10 BENIGN ESSENTIAL HYPERTENSION: ICD-10-CM

## 2021-06-17 DIAGNOSIS — E55.9 VITAMIN D DEFICIENCY: ICD-10-CM

## 2021-06-17 DIAGNOSIS — R80.9 TYPE 2 DIABETES MELLITUS WITH MICROALBUMINURIA, WITHOUT LONG-TERM CURRENT USE OF INSULIN (H): ICD-10-CM

## 2021-06-17 DIAGNOSIS — Z71.85 IMMUNIZATION COUNSELING: ICD-10-CM

## 2021-06-17 DIAGNOSIS — E11.29 TYPE 2 DIABETES MELLITUS WITH MICROALBUMINURIA, WITHOUT LONG-TERM CURRENT USE OF INSULIN (H): ICD-10-CM

## 2021-06-17 DIAGNOSIS — R80.9 MICROALBUMINURIA: ICD-10-CM

## 2021-06-17 LAB — HBA1C MFR BLD: 6.8 %

## 2021-06-17 ASSESSMENT — MIFFLIN-ST. JEOR: SCORE: 925.47

## 2021-06-17 NOTE — TELEPHONE ENCOUNTER
Refill Approved    Rx renewed per Medication Renewal Policy. Medication was last renewed on 2/28/20.    Lg [facility], [facility] Triage/Med Refill 5/4/2021     Requested Prescriptions   Pending Prescriptions Disp Refills     ACCU-CHEK GUIDE TEST STRIPS strips [Pharmacy Med Name: ACCU-CHEK GUIDE TEST STRIP] 50 strip 6     Sig: TEST BLOOD GLUCOSE AS DIRECTED AS NEEDED       Diabetic Supplies Refill Protocol Passed - 5/3/2021  1:57 PM        Passed - Visit with PCP or prescribing provider visit in last 6 months     Last office visit with prescriber/PCP: Visit date not found OR same dept: Visit date not found OR same specialty: 2/4/2020 Emiliano Zimmer MD  Last physical: Visit date not found Last MTM visit: Visit date not found   Next visit within 3 mo: Visit date not found  Next physical within 3 mo: Visit date not found  Prescriber OR PCP: Annetta Kim MD  Last diagnosis associated with med order: 1. Type 2 diabetes mellitus with microalbuminuria, without long-term current use of insulin (H)  - ACCU-CHEK GUIDE TEST STRIPS strips [Pharmacy Med Name: ACCU-CHEK GUIDE TEST STRIP]; TEST BLOOD GLUCOSE AS DIRECTED AS NEEDED  Dispense: 50 strip; Refill: 6    If protocol passes may refill for 12 months if within 3 months of last provider visit (or a total of 15 months).             Passed - A1C in last 6 months     Hemoglobin A1c   Date Value Ref Range Status   04/09/2021 6.8 (H) <=5.6 % Final

## 2021-06-17 NOTE — TELEPHONE ENCOUNTER
Refill Approved    Rx renewed per Medication Renewal Policy. Medication was last renewed on 2/20/20.    Lg Parsons, [facility] Triage/Med Refill 5/4/2021     Requested Prescriptions   Pending Prescriptions Disp Refills     ACCU-CHEK FASTCLIX LANCET DRUM 102 each 3     Sig: USE TO CHECK BLOOD SUGAR ONCE DAILY       Diabetic Supplies Refill Protocol Passed - 5/3/2021  1:57 PM        Passed - Visit with PCP or prescribing provider visit in last 6 months     Last office visit with prescriber/PCP: 2/4/2020 Emiliano Zimmer MD OR same dept: Visit date not found OR same specialty: 2/4/2020 Emiliano Zimmer MD  Last physical: 11/8/2018 Last MTM visit: Visit date not found   Next visit within 3 mo: Visit date not found  Next physical within 3 mo: Visit date not found  Prescriber OR PCP: Emiliano Zimmer MD  Last diagnosis associated with med order: 1. Type 2 diabetes mellitus with microalbuminuria, without long-term current use of insulin (H)  - ACCU-CHEK FASTCLIX LANCET DRUM; USE TO CHECK BLOOD SUGAR ONCE DAILY  Dispense: 102 each; Refill: 3    If protocol passes may refill for 12 months if within 3 months of last provider visit (or a total of 15 months).             Passed - A1C in last 6 months     Hemoglobin A1c   Date Value Ref Range Status   04/09/2021 6.8 (H) <=5.6 % Final

## 2021-06-18 LAB — 25(OH)D3 SERPL-MCNC: 29.7 NG/ML (ref 30–80)

## 2021-06-21 NOTE — PROGRESS NOTES
"Subjective: This patient comes in for evaluation is a first time the clinic for this patient    She is lived in [place] for 10 years came to [place] in 2005 and lived in [place] for a while    Patient has past medical history of arm fracture and had surgery she has 9 kids all normal spontaneous vaginal deliveries.  Her youngest is 25 years old.    She has had a previous L1 burst fracture    She has had a history of diabetes but has been off medicine for 3 months.    She has no medications that she is taking presently.    On review of systems she has had some achiness in through the shoulder blades and neck area no radicular component.    She denies any weakness or numbness chest pain or shortness of breath.  Her weight is been stable.    She did need a flu shot today    She is been at [facility] prior to this for medical care    She is not checking blood sugars.    No additional concerns or issues.    Tobacco status: She  reports that she has never smoked. She has never used smokeless tobacco.    Patient Active Problem List    Diagnosis Date Noted     Lumbar burst fracture (H) 11/10/2018     Diabetes mellitus, type 2 (H) 11/10/2018     Abnormality of gait 09/02/2008       Current Outpatient Prescriptions   Medication Sig Dispense Refill     acetaminophen (TYLENOL EXTRA STRENGTH) 500 MG tablet 1-2 po three times a day prn pain 60 tablet 1     metFORMIN (GLUCOPHAGE XR) 500 MG 24 hr tablet Take 1 tablet (500 mg total) by mouth daily with breakfast. 30 tablet 2     No current facility-administered medications for this visit.        Family history: Noncontributory, patient's unsure.    ROS: 10 point review of systems negative other than as outlined above    Objective:    /70 (Patient Site: Left Arm, Patient Position: Sitting, Cuff Size: Adult Regular)  Pulse 68  Ht 7' 7.75\" (2.33 m)  Wt 109 lb (49.4 kg)  BMI 9.1 kg/m2  Body mass index is 9.1 kg/(m^2).      General appearance no acute " distress    HEENT neck is negative oropharynx is clear pupils react normally oropharynx did have upper and lower dentures    Lungs are clear no rales or rhonchi heart was regular S1-S2 rate in the 60s    Abdomen is soft nontender no guarding or rebound no axillary or inguinal adenopathy    Extremities without edema, sensation was normal    No calf redness warmth or swelling    No real joint tenderness or warmth    She does have some tenderness in the paraspinal muscles in the cervical area    Results for orders placed or performed in visit on 11/08/18   Comprehensive Metabolic Panel   Result Value Ref Range    Sodium 140 136 - 145 mmol/L    Potassium 4.0 3.5 - 5.0 mmol/L    Chloride 103 98 - 107 mmol/L    CO2 27 22 - 31 mmol/L    Anion Gap, Calculation 10 5 - 18 mmol/L    Glucose 134 (H) 70 - 125 mg/dL    BUN 12 8 - 22 mg/dL    Creatinine 0.63 0.60 - 1.10 mg/dL    GFR MDRD Af Amer >60 >60 mL/min/1.73m2    GFR MDRD Non Af Amer >60 >60 mL/min/1.73m2    Bilirubin, Total 0.8 0.0 - 1.0 mg/dL    Calcium 9.4 8.5 - 10.5 mg/dL    Protein, Total 8.5 (H) 6.0 - 8.0 g/dL    Albumin 3.8 3.5 - 5.0 g/dL    Alkaline Phosphatase 88 45 - 120 U/L    AST 19 0 - 40 U/L    ALT 16 0 - 45 U/L   Lipid Cascade RANDOM   Result Value Ref Range    Cholesterol 196 <=199 mg/dL    Triglycerides 144 <=149 mg/dL    HDL Cholesterol 41 (L) >=50 mg/dL    LDL Calculated 126 <=129 mg/dL    Patient Fasting > 8hrs? No    Glycosylated Hemoglobin A1c   Result Value Ref Range    Hemoglobin A1c 7.4 (H) 3.5 - 6.0 %       Assessment:  1. Diabetes mellitus, type 2 (H)  metFORMIN (GLUCOPHAGE XR) 500 MG 24 hr tablet   2. Need for vaccination  Influenza, Recombinant, Inj, Quadrivalent, PF, 18+YRS   3. Cervical pain  acetaminophen (TYLENOL EXTRA STRENGTH) 500 MG tablet   4. Hyperglycemia  Comprehensive Metabolic Panel    Lipid Cascade RANDOM    Glycosylated Hemoglobin A1c   5. Lumbar burst fracture (H)       Diabetes mellitus, restart medication I will place her on  metformin xr 500 mg 1 a day we will see her back for recheck in 3 weeks    Flu shot given    Cervical pain nonradicular use Tylenol    Labs otherwise unremarkable will probably need to get her on a statin and check microalbumin as well and have her see an ophthalmologist    Previous lumbar burst fracture no ongoing symptoms    Plan: As outlined above start on metformin, recheck 3 weeks    This transcription uses voice recognition software, which may contain typographical errors.

## 2021-06-22 ENCOUNTER — COMMUNICATION - HEALTHEAST (OUTPATIENT)
Dept: FAMILY MEDICINE | Facility: CLINIC | Age: 66
End: 2021-06-22

## 2021-06-22 NOTE — PROGRESS NOTES
"Subjective: This patient comes in for follow-up she is here with her daughter she has diabetes.    She is seen previously she is on metformin 500 mg 1 a day A1c was 7.4 on November 8 she had normal liver tests normal renal function LDL was 126.    That was a first visit to the clinic.    She states that the metformin makes her kind of nauseated and gives her a headache.    I did change her over to Januvia 100 mg 1 a day did.  She will start that now and stop the metformin recheck in 6 weeks    Check microalbumin today please see below    Also will start on atorvastatin 20 mg daily.    No new findings or problems    Did not want mammogram or colonoscopy or Pap smear.    No additional concerns or issues    Tobacco status: She  reports that  has never smoked. she has never used smokeless tobacco.    Patient Active Problem List    Diagnosis Date Noted     Microalbuminuria 12/04/2018     Lumbar burst fracture (H) 11/10/2018     Diabetes mellitus, type 2 (H) 11/10/2018     Abnormality of gait 09/02/2008       Current Outpatient Medications   Medication Sig Dispense Refill     acetaminophen (TYLENOL EXTRA STRENGTH) 500 MG tablet 1-2 po three times a day prn pain 60 tablet 1     atorvastatin (LIPITOR) 20 MG tablet Take 1 tablet (20 mg total) by mouth daily. 30 tablet 3     losartan (COZAAR) 25 MG tablet Take 1 tablet (25 mg total) by mouth daily. 30 tablet 3     sitaGLIPtin (JANUVIA) 100 MG tablet Take 1 tablet (100 mg total) by mouth daily With or without food. 30 tablet 3     No current facility-administered medications for this visit.        ROS: 10 point review of systems positive as outlined otherwise negative    Objective:    /78 (Patient Site: Right Arm, Patient Position: Sitting, Cuff Size: Adult Regular)   Pulse 76   Temp 98.1  F (36.7  C) (Oral)   Resp 16   Ht 4' 8\" (1.422 m)   Wt 111 lb (50.3 kg)   LMP  (LMP Unknown)   BMI 24.89 kg/m    Body mass index is 24.89 kg/m .      General appearance no acute " distress    HEENT neck is negative oropharynx is clear pupils react normally    Lungs are clear no rales or rhonchi heart regular S1-S2    Abdomen soft nontender no guarding rebound    Extremities without edema pulses normal.    Skin was normal no rashes.    Microalbumin positive as outlined below    Results for orders placed or performed in visit on 12/04/18   Microalbumin, Random Urine   Result Value Ref Range    Microalbumin, Random Urine 3.86 (H) 0.00 - 1.99 mg/dL    Creatinine, Urine 65.5 mg/dL    Microalbumin/Creatinine Ratio Random Urine 58.9 (H) <=19.9 mg/g       Assessment:  1. Type 2 diabetes mellitus without complication, without long-term current use of insulin (H)  sitaGLIPtin (JANUVIA) 100 MG tablet    atorvastatin (LIPITOR) 20 MG tablet    Microalbumin, Random Urine   2. Microalbuminuria  losartan (COZAAR) 25 MG tablet     Type 2 diabetes, side effects from metformin changed to Januvia 100 mg 1 a day,    Atorvastatin 20 mg 1 a day    Microalbuminuria start on losartan 25 mg 1 a day    Plan: As outlined above, recheck in 6 weeks check creatinine check lipid check AST check ALT.    This transcription uses voice recognition software, which may contain typographical errors.
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
## 2021-06-23 NOTE — TELEPHONE ENCOUNTER
Called pt and relayed results/directions to her. Pt stated understanding and that she already has a follow up appt scheduled for April.

## 2021-06-23 NOTE — TELEPHONE ENCOUNTER
----- Message from Emiliano Zimmer MD sent at 1/19/2019 12:00 PM CST -----  Please contact this patient, let her know that the diabetes looked good the A1c was 7.5 stay on the same medication, Januvia    Also the cholesterol level and kidney function were normal.  Stay on the same med for that.    Follow-up in 3 months no change in medication

## 2021-06-23 NOTE — TELEPHONE ENCOUNTER
Called pt's primary phone. Still unable to leave a message due to no VM system set up. Tried home phone and left message to call back.     Okay to relay message.

## 2021-06-26 NOTE — TELEPHONE ENCOUNTER
----- Message from Emiliano Zimmer MD sent at 6/20/2021  5:59 PM CDT -----  Please contact this patient, let her know the vitamin D level look much better stay on the same vitamin D 5000 units daily.    Also her A1c look good regarding diabetes at 6.8.  Stay on the same doses of diabetic medication.    Follow-up with me for recheck in 3 months face-to-face visit.

## 2021-06-26 NOTE — PROGRESS NOTES
"    Assessment & Plan     Ruben was seen today for follow-up and diabetes.    Diagnoses and all orders for this visit:    Type 2 diabetes mellitus with microalbuminuria, without long-term current use of insulin (H)  -     Glycosylated Hemoglobin A1c    Microalbuminuria    Benign essential hypertension    Vitamin D deficiency  -     Vitamin D, Total (25-Hydroxy)    Hyperlipidemia, unspecified hyperlipidemia type    Immunization counseling          Diabetes mellitus seems to be under control will await A1c.  Follow-up with ophthalmology in September    Hyperlipidemia stable continue atorvastatin 20 mg a day at last .    Colon cancer screening with a negative Cologuard this year recheck in 3 years    Hypertension now controlled on the increase losartan 50 mg daily.    Vitamin D deficiency now on 5000 units daily recheck vitamin D level.  Patient be contacted    Immunization counseling discussed Shingrix vaccination she says she get that next time.    We will contact patient with results and discuss follow-up           BMI:   Estimated body mass index is 25.78 kg/m  as calculated from the following:    Height as of this encounter: 4' 7.88\" (1.419 m).    Weight as of this encounter: 114 lb 8 oz (51.9 kg).       Return in about 3 months (around 9/17/2021) for Recheck.    Emiliano Zimmer MD  [facility]    Subjective   Ruben Cisneros is 66 y.o. and presents today for the following health issues   HPI   This patient comes in for follow-up on multiple medical conditions.  She had a virtual visit, telephone, back on 3/25/2021.    Labs from April 9 showed vitamin D low at 16.7 we increase her to 5000 units daily and vitamin D.    Microalbumin is negative AST and ALT were normal     Her A1c was 6.8.    Patient is on glipizide 10 mg 1 morning and half tablet in the p.m.    She takes it with 200 mg a day    Blood pressures been controlled on losartan.  She had been on 25 mg previously not 50 mg " "daily blood pressure look good today 122/76.    She is due for ophthalmology follow-up in September 2021.    She continues on the atorvastatin for the lipid control.    She had a Cologuard this winter now is negative she is due again in 3 years on that.    Labs today include A1c and vitamin D    We talked about immunizations she wants to hold off on the Shingrix for now, she has had her COVID-19 vaccinations      Review of Systems  10 point review of systems positive as outlined above otherwise negative      Objective    /76   Pulse 79   Ht 4' 7.88\" (1.419 m)   Wt 114 lb 8 oz (51.9 kg)   LMP  (LMP Unknown)   SpO2 97%   BMI 25.78 kg/m    Body mass index is 25.78 kg/m .  Physical Exam  General appearance no acute distress    HEENT pupils react normally extract movements full    Neck is negative nontender    No bruits    Lungs clear throughout no rales or rhonchi, heart regular S1-S2 regular rate in the 80 range.    O2 sat was 97%    No abdominal tenderness.  No fullness or masses.    Extremities without edema normal sensation and pulses.    A1c and vitamin D pending              "

## 2021-06-30 NOTE — PROGRESS NOTES
Progress Notes by Mehnaz Harding CMA at 4/9/2021 10:00 AM     Author: Mehnaz Harding CMA Service: -- Author Type: Medical Assistant    Filed: 4/9/2021 10:26 AM Encounter Date: 4/9/2021 Status: Attested    : Mehnaz Harding CMA (Medical Assistant) Cosigner: Emiliano Zimmer MD at 4/13/2021  4:04 PM    Attestation signed by Emiliano Zimmer MD at 4/13/2021  4:04 PM    I increase the losartan from 25 to 50 mg daily                I met with Ruben Cisneros at the request of Dr. Zimmer to recheck her blood pressure.  Blood pressure medications on the MAR were reviewed with patient.    Patient has taken all medications as per usual regimen: Yes  Patient reports tolerating them without any issues or concerns: No    Vitals:    04/09/21 0954 04/09/21 1004   BP: 169/80 144/81   Patient Site: Left Arm Left Arm   Patient Position: Sitting Sitting   Cuff Size: Adult Regular Adult Regular   Pulse: 77 74       After 5 minutes, the patient's blood pressure remained greater than or equal to 140/90.    Is the patient currently having any chest pain?  No  Does the patient currently have a headache?   No  Does the patient currently have any vision changes? No  Does the patient currently have any nausea? No  Does the patient currently have any abdominal pain? No    Consulted blood pressure readings with the covering provider, Dr. Syed. Dr. Syed ok'd for the patient to go home and will route message to Dr. Zimmer (PCP) to review and advise.

## 2021-07-06 VITALS
WEIGHT: 114.5 LBS | DIASTOLIC BLOOD PRESSURE: 76 MMHG | BODY MASS INDEX: 25.76 KG/M2 | OXYGEN SATURATION: 97 % | HEART RATE: 79 BPM | HEIGHT: 56 IN | SYSTOLIC BLOOD PRESSURE: 122 MMHG

## 2021-09-23 ENCOUNTER — OFFICE VISIT (OUTPATIENT)
Dept: FAMILY MEDICINE | Facility: CLINIC | Age: 66
End: 2021-09-23
Payer: COMMERCIAL

## 2021-09-23 VITALS
DIASTOLIC BLOOD PRESSURE: 86 MMHG | HEART RATE: 71 BPM | OXYGEN SATURATION: 97 % | BODY MASS INDEX: 24.09 KG/M2 | WEIGHT: 107 LBS | SYSTOLIC BLOOD PRESSURE: 130 MMHG

## 2021-09-23 DIAGNOSIS — E11.9 TYPE 2 DIABETES MELLITUS WITHOUT COMPLICATION, WITHOUT LONG-TERM CURRENT USE OF INSULIN (H): Primary | ICD-10-CM

## 2021-09-23 DIAGNOSIS — E55.9 VITAMIN D DEFICIENCY: ICD-10-CM

## 2021-09-23 DIAGNOSIS — R20.2 PARESTHESIA: ICD-10-CM

## 2021-09-23 LAB — HBA1C MFR BLD: 6.3 % (ref 0–5.6)

## 2021-09-23 PROCEDURE — 83036 HEMOGLOBIN GLYCOSYLATED A1C: CPT | Performed by: FAMILY MEDICINE

## 2021-09-23 PROCEDURE — 36415 COLL VENOUS BLD VENIPUNCTURE: CPT | Performed by: FAMILY MEDICINE

## 2021-09-23 PROCEDURE — 99214 OFFICE O/P EST MOD 30 MIN: CPT | Performed by: FAMILY MEDICINE

## 2021-09-23 RX ORDER — GABAPENTIN 100 MG/1
100 CAPSULE ORAL 3 TIMES DAILY
Qty: 90 CAPSULE | Refills: 0 | Status: SHIPPED | OUTPATIENT
Start: 2021-09-23 | End: 2022-04-25

## 2021-09-29 DIAGNOSIS — E11.29 TYPE 2 DIABETES MELLITUS WITH MICROALBUMINURIA, WITHOUT LONG-TERM CURRENT USE OF INSULIN (H): ICD-10-CM

## 2021-09-29 DIAGNOSIS — R80.9 TYPE 2 DIABETES MELLITUS WITH MICROALBUMINURIA, WITHOUT LONG-TERM CURRENT USE OF INSULIN (H): ICD-10-CM

## 2021-09-30 DIAGNOSIS — E11.29 TYPE 2 DIABETES MELLITUS WITH MICROALBUMINURIA, WITHOUT LONG-TERM CURRENT USE OF INSULIN (H): ICD-10-CM

## 2021-09-30 DIAGNOSIS — R80.9 TYPE 2 DIABETES MELLITUS WITH MICROALBUMINURIA, WITHOUT LONG-TERM CURRENT USE OF INSULIN (H): ICD-10-CM

## 2021-09-30 RX ORDER — LANCETS
EACH MISCELLANEOUS
Qty: 100 EACH | Refills: 3 | Status: SHIPPED | OUTPATIENT
Start: 2021-09-30 | End: 2024-08-29

## 2021-09-30 NOTE — TELEPHONE ENCOUNTER
"Last Written Prescription Date:  5/4/21  Last Fill Quantity: 100,  # refills: 3   Last office visit provider:  6/17/21     Requested Prescriptions   Pending Prescriptions Disp Refills     blood glucose monitoring (ACCU-CHEK FASTCLIX) lancets 100 each 3     Sig: [ACCU-CHEK FASTCLIX LANCET DRUM] USE TO CHECK BLOOD SUGAR ONCE DAILY       Diabetic Supplies Protocol Failed - 9/29/2021  8:10 AM        Failed - Recent (6 mo) or future (30 days) visit within the authorizing provider's specialty     Patient had office visit in the last 6 months or has a visit in the next 30 days with authorizing provider.  See \"Patient Info\" tab in inbasket, or \"Choose Columns\" in Meds & Orders section of the refill encounter.            Passed - Medication is active on med list        Passed - Patient is 18 years of age or older             Perfecto Mccrary RN 09/30/21 1:12 PM  "

## 2021-10-01 RX ORDER — LANCETS
EACH MISCELLANEOUS
Qty: 100 EACH | Refills: 3 | OUTPATIENT
Start: 2021-10-01

## 2021-10-01 NOTE — TELEPHONE ENCOUNTER
"Duplicate!!!  Last Written Prescription Date:  09/30/2021  Last Fill Quantity: 100,  # refills: 3   Last office visit provider:  09/23/2021 with Dr Fink.    Requested Prescriptions   Pending Prescriptions Disp Refills     blood glucose monitoring (ACCU-CHEK FASTCLIX) lancets 100 each 3     Sig: [ACCU-CHEK FASTCLIX LANCET DRUM] USE TO CHECK BLOOD SUGAR ONCE DAILY       Diabetic Supplies Protocol Failed - 9/30/2021  2:54 PM        Failed - Recent (6 mo) or future (30 days) visit within the authorizing provider's specialty     Patient had office visit in the last 6 months or has a visit in the next 30 days with authorizing provider.  See \"Patient Info\" tab in inbasket, or \"Choose Columns\" in Meds & Orders section of the refill encounter.            Passed - Medication is active on med list        Passed - Patient is 18 years of age or older             Nilsa Morales 10/01/21 2:56 PM  "

## 2021-12-08 DIAGNOSIS — E78.5 HYPERLIPIDEMIA, UNSPECIFIED HYPERLIPIDEMIA TYPE: ICD-10-CM

## 2021-12-09 RX ORDER — ATORVASTATIN CALCIUM 20 MG/1
20 TABLET, FILM COATED ORAL DAILY
Qty: 90 TABLET | Refills: 1 | Status: SHIPPED | OUTPATIENT
Start: 2021-12-09 | End: 2022-09-22

## 2021-12-09 NOTE — TELEPHONE ENCOUNTER
"Last Written Prescription Date:  3/2521  Last Fill Quantity: 90,  # refills: 1   Last office visit provider:  9/23/21     Requested Prescriptions   Pending Prescriptions Disp Refills     atorvastatin (LIPITOR) 20 MG tablet 90 tablet 1     Sig: Take 1 tablet (20 mg) by mouth daily       Statins Protocol Passed - 12/8/2021  7:27 AM        Passed - LDL on file in past 12 months     Recent Labs   Lab Test 04/09/21  0941                Passed - No abnormal creatine kinase in past 12 months     No lab results found.             Passed - Recent (12 mo) or future (30 days) visit within the authorizing provider's specialty     Patient has had an office visit with the authorizing provider or a provider within the authorizing providers department within the previous 12 mos or has a future within next 30 days. See \"Patient Info\" tab in inbasket, or \"Choose Columns\" in Meds & Orders section of the refill encounter.              Passed - Medication is active on med list        Passed - Patient is age 18 or older        Passed - No active pregnancy on record        Passed - No positive pregnancy test in past 12 months             Talya Hicks RN 12/09/21 10:32 AM  "
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
## 2022-03-08 ENCOUNTER — PATIENT OUTREACH (OUTPATIENT)
Dept: GERIATRIC MEDICINE | Facility: CLINIC | Age: 67
End: 2022-03-08

## 2022-03-08 NOTE — PROGRESS NOTES
[facility] Care Coordination Contact    Member became effective with  [facility] on 03/02/22 with [facility] MSC+.  Previous Health Plan: [facility]  Previous Care System: [facility]  Previous care coordinators name and number: Arlene Sánchez (ph??)  Waiver Type: N/A  Last MMIS Entry: Date 05/04/21 and Type 50 NT LOCATE    UTF received: No: Requested on 3/8/2022. Not sure if there will be anything since member hasn't been located.  Sent email to [email or web address] to check status of transfer records.    Daisy Looney  Care Management Specialist  [facility]  [phone]

## 2022-03-08 NOTE — LETTER
March 8, 2022    BENNY VERGARA  [address] [place] [address]    Dear  Benny,    Welcome to [facility] s [place] Care Plus (MSC+) health program. My name is LUCIANO Waddell, EMPERATRIZ. I am your MSC+ care coordinator. You are eligible for Care Coordination through [facility]+ Product.    Here is how Care Coordination works:    I will meet with you in person to determine your care coordination needs    We will develop a plan of care to meet your needs    We will create a service plan showing the services you will receive    We will talk about and coordinate any preventive care needs you have    I will call you soon to see how you are doing and determine what needs you may have. Our goal is to keep you as healthy and independent as possible.     Soon, you will receive a new [facility] member identification (ID) card from [facility]. When you receive it, please use this card along with your [place] Health Care Programs card and Prescription Drug Coverage Program card. When you receive, it please use this card where you get your health services. If you have Medicare, you will need to show your Medicare card when you get health services.    Being in the [place] Care Plus (MSC+) Care Coordination program is voluntary and offered to you at no cost. If you ever wish to stop being in the Care Coordination program or have questions, call me at  . If you reach my voice mail, leave a message and your phone number. If you are hearing impaired, please call the [place] Relay at 263 or [phone] (apwnem-cd-vkexhd relay service).    Sincerely,      LUCIANO Waddell, EMPERATRIZ    E-mail: [email or web address]  Phone: [phone]      [facility]    [id number] accepted   (12/2019)

## 2022-03-10 ENCOUNTER — PATIENT OUTREACH (OUTPATIENT)
Dept: GERIATRIC MEDICINE | Facility: CLINIC | Age: 67
End: 2022-03-10
Payer: COMMERCIAL

## 2022-03-14 ENCOUNTER — PATIENT OUTREACH (OUTPATIENT)
Dept: GERIATRIC MEDICINE | Facility: CLINIC | Age: 67
End: 2022-03-14
Payer: COMMERCIAL

## 2022-03-14 NOTE — PROGRESS NOTES
[facility] Care Coordination Contact      [facility] Health Plan or Product Change    CC received notification that member's health [place] or health [place] [employer] MSC[place] effective 3/1/22.  CC contacted member using an  and discussed change and face-to-face visit was offered. Ruben asked me to talk to her daughter, Amish.   No Health Risk Assessment/LTCC and POC to review.    Called Amish, [phone]. No answer, VM full, could not leave a message.    Required referral authorization information communicated to CMS: Not applicable  Writer reviewed the following with member:  ER visits: No  Hospitalizations: No  TCU stays: No  Significant health status changes:   Falls/Injuries: unknown  ADL/IADL changes: unknown  Changes in services: No current services    Follow-Up Plan: Member informed of future contact, plan to f/u with member with at next regularly scheduled contact.  Contact information shared with member and family, encouraged member to call with any questions or concerns.  Cecilia WOLF, CCM  [facility] Care Coordinator   Telephone: [phone]  Fax: [phone]

## 2022-03-14 NOTE — PROGRESS NOTES
[facility] Care Coordination Contact    Received:Mar 10, 2022 8:32 AM  Expires:Mar 24, 2022 7:32 AM  From:[email or web address]  To:[email or web address]  Cc:  Subject:PHI: RE: [EXTERNAL]Transfer docs - Ruben Cisneros  Attachments: [email or web address] ,  [email or web address]  Images in this message have been disabled.   This message was sent securely using [employer]       Hello Daisy,     This member Ruben Cisneros ( 1955) [id number] was a product change [place] to MSC+, I do not see a UTF for her.        Best regards,     Mehnaz Nhia [place]  [facility]MSC[facility] CM Intake  P: [phone]  F: [phone]  [email or web address]  [facility] [place] [employer]    Cecilia WOLF, [facility] Care Coordinator   Telephone: [phone]  Fax: [phone]

## 2022-03-15 ENCOUNTER — PATIENT OUTREACH (OUTPATIENT)
Dept: GERIATRIC MEDICINE | Facility: CLINIC | Age: 67
End: 2022-03-15
Payer: COMMERCIAL

## 2022-03-15 NOTE — PROGRESS NOTES
[facility] Care Coordination Contact    Called Amish. Explained my role and offered an assessment for her mother. Amish said she will be seeing her mom tomorrow so will ask her then and let me know.    Cecilia WOLF, [facility] Care Coordinator   Telephone: [phone]  Fax: [phone]

## 2022-03-18 ENCOUNTER — PATIENT OUTREACH (OUTPATIENT)
Dept: GERIATRIC MEDICINE | Facility: CLINIC | Age: 67
End: 2022-03-18
Payer: COMMERCIAL

## 2022-03-18 NOTE — LETTER
March 23, 2022    BENNY VERGARA  [address] [place] [address]      Dear Benny:    As a member of [facility]'s MSC+ program, we offer a health risk assessment at no cost to you. I know you don't want to have the assessment right now. If you change your mind, please call me at the number below.    Who performs the health risk assessment?  A [facility] Care Coordinator performs the assessment. Our Care Coordinators can also help you understand your benefits. They can tell you about services to aid you at home, such as managing your care with your doctors if your health worsens.    Our Care Coordinators are here for you if you need:    Support for activities you used to do by yourself (including making meals, bathing and paying bills)    Equipment for bathroom or home safety    Help finding a new place to live    Information on staying healthy, preventing falls and immunizations    Questions?  If you have questions, or you would like to do he assessment, call me at [phone]. TTY users call [phone]. I'm here from 8am to 5pm. I may reach out to you again soon.       Sincerely,       LUCIANO Waddell, [facility]    E-mail: [email or web address]  Phone: [phone]      [facility]      \<[id number] accepted  [id number]>    [id number] (21/2021)

## 2022-03-18 NOTE — PROGRESS NOTES
[facility] Care Coordination Contact    Called Amish. She said her mom said she was ok the way she is and doesn't want an assessment now. I told Amish that was fine and will call her in 6 months to see if anything has changed. Amish said that is good.     Cecilia WOLF, [facility] Care Coordinator   Telephone: [phone]  Fax: [phone]    [facility] Care Coordination Contact      [facility] Refusal Telephone Assessment    Member refused home visit HRA on 3/18/22.    ER visits: No  Hospitalizations: No  Health concerns: none reported  Falls/Injuries: No  ADL/IADL Dependencies: unknown          Advanced Care Planning discussion, complete code section.    [employer] sponsored benefits: Shared information re: Silver Sneakers/gym memberships, ASA, Calcium +D.    Follow-Up Plan: Member informed of future contact, plan to f/u with member with a 6 month telephone assessment and offer a home visit.  Contact information shared with member and family, encouraged member to call with any questions or concerns at any time.    Cecilia WOLF, [facility] Care Coordinator   Telephone: [phone]  Fax: [phone]

## 2022-03-23 NOTE — PROGRESS NOTES
"[facility] Care Coordination Contact    Per CC, mailed client a \"Refusal of Home Visit\" letter. Updated Refusal POC with Letter date.    Daisy Looney  Care Management Specialist  [facility]  [phone]   "

## 2022-04-06 DIAGNOSIS — R80.9 MICROALBUMINURIA: ICD-10-CM

## 2022-04-06 DIAGNOSIS — I10 BENIGN ESSENTIAL HYPERTENSION: ICD-10-CM

## 2022-04-07 RX ORDER — LOSARTAN POTASSIUM 50 MG/1
TABLET ORAL
Qty: 90 TABLET | Refills: 0 | Status: SHIPPED | OUTPATIENT
Start: 2022-04-07 | End: 2022-09-22

## 2022-04-07 NOTE — TELEPHONE ENCOUNTER
"Routing refill request to provider for review/approval because:  Labs not current:  K+ and Creatinine  (last checked 7/2/2020)    Last Written Prescription Date:  4/13/2021  Last Fill Quantity: 30,  # refills: 3   Last office visit provider:  9/23/2021     Requested Prescriptions   Pending Prescriptions Disp Refills     losartan (COZAAR) 50 MG tablet [Pharmacy Med Name: LOSARTAN POTASSIUM 50 MG TAB] 30 tablet 3     Sig: TAKE 1 TABLET BY MOUTH EVERY DAY       Angiotensin-II Receptors Failed - 4/6/2022  3:14 PM        Failed - Normal serum creatinine on file in past 12 months     Recent Labs   Lab Test 07/02/20  0930   CR 0.61       Ok to refill medication if creatinine is low          Failed - Normal serum potassium on file in past 12 months     Recent Labs   Lab Test 07/02/20  0930   POTASSIUM 3.8                    Passed - Last blood pressure under 140/90 in past 12 months     BP Readings from Last 3 Encounters:   09/23/21 130/86   06/17/21 122/76   04/09/21 (!) 144/81                 Passed - Recent (12 mo) or future (30 days) visit within the authorizing provider's specialty     Patient has had an office visit with the authorizing provider or a provider within the authorizing providers department within the previous 12 mos or has a future within next 30 days. See \"Patient Info\" tab in inbasket, or \"Choose Columns\" in Meds & Orders section of the refill encounter.              Passed - Medication is active on med list        Passed - Patient is age 18 or older        Passed - No active pregnancy on record        Passed - No positive pregnancy test in past 12 months             Yolanda Carcamo RN 04/07/22 2:45 PM  "

## 2022-04-25 ENCOUNTER — OFFICE VISIT (OUTPATIENT)
Dept: FAMILY MEDICINE | Facility: CLINIC | Age: 67
End: 2022-04-25
Payer: COMMERCIAL

## 2022-04-25 VITALS
WEIGHT: 115.44 LBS | HEART RATE: 73 BPM | OXYGEN SATURATION: 98 % | DIASTOLIC BLOOD PRESSURE: 94 MMHG | SYSTOLIC BLOOD PRESSURE: 162 MMHG | HEIGHT: 56 IN | BODY MASS INDEX: 25.97 KG/M2

## 2022-04-25 DIAGNOSIS — E78.5 HYPERLIPIDEMIA, UNSPECIFIED HYPERLIPIDEMIA TYPE: ICD-10-CM

## 2022-04-25 DIAGNOSIS — I10 PRIMARY HYPERTENSION: ICD-10-CM

## 2022-04-25 DIAGNOSIS — E11.9 TYPE 2 DIABETES MELLITUS WITHOUT COMPLICATION, WITHOUT LONG-TERM CURRENT USE OF INSULIN (H): Primary | ICD-10-CM

## 2022-04-25 DIAGNOSIS — Z23 HIGH PRIORITY FOR 2019-NCOV VACCINE: ICD-10-CM

## 2022-04-25 LAB
ALBUMIN SERPL-MCNC: 3.8 G/DL (ref 3.5–5)
ALP SERPL-CCNC: 82 U/L (ref 45–120)
ALT SERPL W P-5'-P-CCNC: 19 U/L (ref 0–45)
ANION GAP SERPL CALCULATED.3IONS-SCNC: 13 MMOL/L (ref 5–18)
AST SERPL W P-5'-P-CCNC: 19 U/L (ref 0–40)
BILIRUB SERPL-MCNC: 0.6 MG/DL (ref 0–1)
BUN SERPL-MCNC: 8 MG/DL (ref 8–22)
CALCIUM SERPL-MCNC: 9.1 MG/DL (ref 8.5–10.5)
CHLORIDE BLD-SCNC: 105 MMOL/L (ref 98–107)
CHOLEST SERPL-MCNC: 184 MG/DL
CO2 SERPL-SCNC: 23 MMOL/L (ref 22–31)
CREAT SERPL-MCNC: 0.68 MG/DL (ref 0.6–1.1)
FASTING STATUS PATIENT QL REPORTED: ABNORMAL
GFR SERPL CREATININE-BSD FRML MDRD: >90 ML/MIN/1.73M2
GLUCOSE BLD-MCNC: 108 MG/DL (ref 70–125)
HBA1C MFR BLD: 7.4 % (ref 0–5.6)
HDLC SERPL-MCNC: 36 MG/DL
LDLC SERPL CALC-MCNC: 118 MG/DL
POTASSIUM BLD-SCNC: 3.8 MMOL/L (ref 3.5–5)
PROT SERPL-MCNC: 8.2 G/DL (ref 6–8)
SODIUM SERPL-SCNC: 141 MMOL/L (ref 136–145)
TRIGL SERPL-MCNC: 151 MG/DL

## 2022-04-25 PROCEDURE — 91305 COVID-19,PF,PFIZER (12+ YRS): CPT | Performed by: FAMILY MEDICINE

## 2022-04-25 PROCEDURE — 36415 COLL VENOUS BLD VENIPUNCTURE: CPT | Performed by: FAMILY MEDICINE

## 2022-04-25 PROCEDURE — 83036 HEMOGLOBIN GLYCOSYLATED A1C: CPT | Performed by: FAMILY MEDICINE

## 2022-04-25 PROCEDURE — 99214 OFFICE O/P EST MOD 30 MIN: CPT | Performed by: FAMILY MEDICINE

## 2022-04-25 PROCEDURE — 80053 COMPREHEN METABOLIC PANEL: CPT | Performed by: FAMILY MEDICINE

## 2022-04-25 PROCEDURE — 0054A COVID-19,PF,PFIZER (12+ YRS): CPT | Performed by: FAMILY MEDICINE

## 2022-04-25 PROCEDURE — 80061 LIPID PANEL: CPT | Performed by: FAMILY MEDICINE

## 2022-04-25 NOTE — PATIENT INSTRUCTIONS
Increase losartan to 50mg, 1 tablet twice daily  Follow up with primary care physician in 2 weeks for blood pressure check

## 2022-04-25 NOTE — PROGRESS NOTES
uRben was seen today for follow up, immunization and imm/inj.    Diagnoses and all orders for this visit:    Type 2 diabetes mellitus without complication, without long-term current use of insulin (H)  -     Lipid Profile (Chol, Trig, HDL, LDL calc); Future  -     Comprehensive metabolic panel (BMP + Alb, Alk Phos, ALT, AST, Total. Bili, TP); Future  -     Albumin Random Urine Quantitative with Creat Ratio; Future  A1c=7.4  Meds: Continue with Januvia 100 mg and glipizide 15 mg  Microalbuminuria pending  On statin  Not a smoker  Counseled healthy lifestyle modifications  Discussed general issues about diabetes pathophysiology and management.  Addressed ADA diet.  Suggested low cholesterol diet.  Encouraged aerobic exercise.  Discussed foot care.  Reminded to get yearly retinal exam.  Discussed ways to avoid symptomatic hypoglycemia.  F/u in 3 months with PCP    Primary hypertension  Increase losartan to 50 mg twice daily  Follow-up with primary care provider in the next 2 to 4 weeks for blood pressure recheck  -     Lipid Profile (Chol, Trig, HDL, LDL calc); Future  -     Comprehensive metabolic panel (BMP + Alb, Alk Phos, ALT, AST, Total. Bili, TP); Future  -     Lipid Profile (Chol, Trig, HDL, LDL calc)  -     Comprehensive metabolic panel (BMP + Alb, Alk Phos, ALT, AST, Total. Bili, TP)    Hyperlipidemia, unspecified hyperlipidemia type  -     Lipid Profile (Chol, Trig, HDL, LDL calc); Future  -     Comprehensive metabolic panel (BMP + Alb, Alk Phos, ALT, AST, Total. Bili, TP); Future  Continue with Lipitor 20 mg    High priority for 2019-nCoV vaccine  -     COVID-19,PF,PFIZER (12+ Yrs GRAY LABEL)    Subjective:   Ruben Cisneros is a 67 year old female who presents for follow up of diabetes. Evaluation to date has been via hemoglobin A1C and home blood sugars. Home sugars: 130-140  No results found for: HGBA1C      The following portions of the patient's history were reviewed and updated as appropriate: allergies,  current medications, past family history, past medical history, past social history, past surgical history and problem list.    Review of Systems  A 12 point comprehensive review of systems was negative except as noted.     Current Outpatient Medications   Medication Sig Dispense Refill     ACCU-CHEK GUIDE TEST STRIPS strips [ACCU-CHEK GUIDE TEST STRIPS STRIPS] TEST BLOOD GLUCOSE AS DIRECTED AS NEEDED 100 strip 3     acetaminophen (TYLENOL EXTRA STRENGTH) 500 MG tablet [ACETAMINOPHEN (TYLENOL EXTRA STRENGTH) 500 MG TABLET] 1-2 po three times a day prn pain 60 tablet 1     alendronate (FOSAMAX) 70 MG tablet [ALENDRONATE (FOSAMAX) 70 MG TABLET] Take 1 tablet (70 mg total) by mouth every 7 days. Take in the morning on an empty stomach with a full glass of water 30 minutes before food 12 tablet 1     atorvastatin (LIPITOR) 20 MG tablet Take 1 tablet (20 mg) by mouth daily 90 tablet 1     blood glucose monitoring (ACCU-CHEK FASTCLIX) lancets [ACCU-CHEK FASTCLIX LANCET DRUM] USE TO CHECK BLOOD SUGAR ONCE DAILY 100 each 3     calcium carbonate (OS-ANTOINE) 600 mg calcium (1,500 mg) tablet [CALCIUM CARBONATE (OS-ANTIONE) 600 MG CALCIUM (1,500 MG) TABLET] Take 1 tablet (600 mg total) by mouth 2 (two) times a day with meals. 180 tablet 1     cholecalciferol 125 MCG (5000 UT) CAPS [CHOLECALCIFEROL, VITAMIN D3, 125 MCG (5,000 UNIT) CAPSULE] 1 p.o. daily 90 capsule 3     glipiZIDE (GLUCOTROL) 10 MG tablet [GLIPIZIDE (GLUCOTROL) 10 MG TABLET] TAKE 1 TABLET BY MOUTH EVERY MORNING AND 1/2 TABLET BY MOUTH EVERY AFTERNOON WITH FOOD 135 tablet 1     lancing device with lancets (ACCU-CHEK FASTCLIX LANCING DEV) Kit [LANCING DEVICE WITH LANCETS (ACCU-CHEK FASTCLIX LANCING DEV) KIT] Check blood sugar once daily 100 each 3     losartan (COZAAR) 50 MG tablet TAKE 1 TABLET BY MOUTH EVERY DAY 90 tablet 0     sitagliptin (JANUVIA) 100 MG tablet Take 1 tablet (100 mg) by mouth daily 90 tablet 1          Objective:    Physical Exam   BP (!) 162/94    "Pulse 73   Ht 1.41 m (4' 7.5\")   Wt 52.4 kg (115 lb 7 oz)   SpO2 98%   BMI 26.35 kg/m     Constitutional: Patient is oriented to person, place, and time. Patient appears well-developed and well-nourished. No distress.   Head: Normocephalic and atraumatic.   Right Ear: External ear normal.   Left Ear: External ear normal.   Eyes: Conjunctivae and EOM are normal. Right eye exhibits no discharge. Left eye exhibits no discharge. No scleral icterus.   Neck: Neck supple. No JVD present. No tracheal deviation present. No thyromegaly present.   Cardiovascular: Normal rate, regular rhythm, normal heart sounds and intact distal pulses.   Pulmonary/Chest: Effort normal and breath sounds normal. No stridor. No respiratory distress. Patient has no wheezes, no rales, exhibits no tenderness.   Lymphadenopathy:  Patient has no cervical adenopathy.   Neurological: Patient is alert and oriented to person, place, and time. Patient has normal reflexes. No cranial nerve deficit. Coordination normal.   Skin: Skin is warm and dry. No rash noted. Patient is not diaphoretic. No erythema. No pallor.             "

## 2022-05-23 NOTE — TELEPHONE ENCOUNTER
Problem: Nausea/Vomiting  Goal: Maintains oral intake with decreased/no reports of nausea/vomiting  Outcome: Outcome Met, Continue evaluating goal progress toward completion     Problem: Pain  Goal: #Acceptable pain level achieved/maintained at rest using NRS/Faces  Description: This goal is used for patients who can self-report.  Acceptable means the level is at or below the identified comfort/function goal.  Outcome: Outcome Met, Continue evaluating goal progress toward completion     Problem: VTE, Risk for  Goal: # No s/s of VTE  Outcome: Outcome Met, Continue evaluating goal progress toward completion  Goal: # Verbalizes understanding of VTE risk factors and prevention  Description: Document education using the patient education activity.   Outcome: Outcome Met, Continue evaluating goal progress toward completion      ----- Message from Emiliano Zimmer MD sent at 4/13/2021  2:16 PM CDT -----  Please contact this patient, let him know that the urine looked good.    The vitamin D was low I want him to increase his vitamin D up to 5000 units taking 1 tablet daily I sent a new prescription to the pharmacy.    Also his blood pressure was a little high I would like to increase the losartan from 25 mg 1 a day up to 50 mg a day.  I have sent a new prescription to his pharmacy.    The cholesterol level look good, and the liver tests were normal.    Also the diabetes test A1c was 6.8, which is stable    Please schedule a follow-up face-to-face visit with me for 3 months from now

## 2022-05-29 ENCOUNTER — HEALTH MAINTENANCE LETTER (OUTPATIENT)
Age: 67
End: 2022-05-29

## 2022-06-27 ENCOUNTER — PATIENT OUTREACH (OUTPATIENT)
Dept: GERIATRIC MEDICINE | Facility: CLINIC | Age: 67
End: 2022-06-27

## 2022-06-27 NOTE — PROGRESS NOTES
[facility] Care Coordination Contact    CHW, attempted to [facility] mbr on home and mobile # unable to lv voice mail.    HAKAN Robison  [facility]  [phone]

## 2022-08-19 ENCOUNTER — PATIENT OUTREACH (OUTPATIENT)
Dept: GERIATRIC MEDICINE | Facility: CLINIC | Age: 67
End: 2022-08-19

## 2022-08-19 NOTE — PROGRESS NOTES
CC updated targets and tasks for [employer] launch.    Cecilia WOLF, [facility] Coordinator   Telephone: [phone]  Fax: [phone]

## 2022-09-07 NOTE — PROGRESS NOTES
[facility] Care Coordination Contact    Called adult daughter Amish to complete six month assessment and couldn't leave a message as VM box was full.     Cecilia WOLF, [facility] Care Coordinator   Telephone: [phone]  Fax: [phone]

## 2022-09-08 ENCOUNTER — PATIENT OUTREACH (OUTPATIENT)
Dept: GERIATRIC MEDICINE | Facility: CLINIC | Age: 67
End: 2022-09-08

## 2022-09-08 NOTE — PROGRESS NOTES
[facility] Care Coordination Contact    Called adult daughter Amish to complete six month assessment but couldn't leave a message as mailbox was full.     Called Ruben using an . Said she is doing good, no problems.      [facility] Six-Month Telephone Assessment    ER visits: No  Hospitalizations: No  TCU stays: No  Significant health status changes: no  Falls/Injuries: No  ADL/IADL changes: No  Changes in services: No    Caregiver Assessment follow up:      Goals: See POC in chart for goal progress documentation. Was a refusal in March.     Will see member in 6 months for an annual health risk assessment.   Encouraged member to call CC with any questions or concerns in the meantime.     Cecilia WOLF, CCM  [facility] Care Coordinator   Telephone: [phone]  Fax: [phone]

## 2022-09-22 ENCOUNTER — OFFICE VISIT (OUTPATIENT)
Dept: FAMILY MEDICINE | Facility: CLINIC | Age: 67
End: 2022-09-22
Payer: COMMERCIAL

## 2022-09-22 VITALS
SYSTOLIC BLOOD PRESSURE: 144 MMHG | HEART RATE: 72 BPM | WEIGHT: 109.04 LBS | BODY MASS INDEX: 24.89 KG/M2 | TEMPERATURE: 97 F | DIASTOLIC BLOOD PRESSURE: 78 MMHG

## 2022-09-22 DIAGNOSIS — M81.0 OSTEOPOROSIS, UNSPECIFIED OSTEOPOROSIS TYPE, UNSPECIFIED PATHOLOGICAL FRACTURE PRESENCE: ICD-10-CM

## 2022-09-22 DIAGNOSIS — I10 BENIGN ESSENTIAL HYPERTENSION: ICD-10-CM

## 2022-09-22 DIAGNOSIS — E11.29 TYPE 2 DIABETES MELLITUS WITH MICROALBUMINURIA, WITHOUT LONG-TERM CURRENT USE OF INSULIN (H): Primary | ICD-10-CM

## 2022-09-22 DIAGNOSIS — R80.9 TYPE 2 DIABETES MELLITUS WITH MICROALBUMINURIA, WITHOUT LONG-TERM CURRENT USE OF INSULIN (H): Primary | ICD-10-CM

## 2022-09-22 DIAGNOSIS — Z11.59 NEED FOR HEPATITIS C SCREENING TEST: ICD-10-CM

## 2022-09-22 DIAGNOSIS — R80.9 MICROALBUMINURIA: ICD-10-CM

## 2022-09-22 DIAGNOSIS — E78.5 HYPERLIPIDEMIA, UNSPECIFIED HYPERLIPIDEMIA TYPE: ICD-10-CM

## 2022-09-22 DIAGNOSIS — E55.9 VITAMIN D DEFICIENCY: ICD-10-CM

## 2022-09-22 DIAGNOSIS — Z11.4 SCREENING FOR HIV (HUMAN IMMUNODEFICIENCY VIRUS): ICD-10-CM

## 2022-09-22 LAB
ALBUMIN SERPL BCG-MCNC: 4 G/DL (ref 3.5–5.2)
ALP SERPL-CCNC: 97 U/L (ref 35–104)
ALT SERPL W P-5'-P-CCNC: 16 U/L (ref 10–35)
ANION GAP SERPL CALCULATED.3IONS-SCNC: 10 MMOL/L (ref 7–15)
AST SERPL W P-5'-P-CCNC: 27 U/L (ref 10–35)
BILIRUB SERPL-MCNC: 0.4 MG/DL
BUN SERPL-MCNC: 13.6 MG/DL (ref 8–23)
CALCIUM SERPL-MCNC: 8.9 MG/DL (ref 8.8–10.2)
CHLORIDE SERPL-SCNC: 103 MMOL/L (ref 98–107)
CREAT SERPL-MCNC: 0.46 MG/DL (ref 0.51–0.95)
DEPRECATED HCO3 PLAS-SCNC: 25 MMOL/L (ref 22–29)
GFR SERPL CREATININE-BSD FRML MDRD: >90 ML/MIN/1.73M2
GLUCOSE SERPL-MCNC: 113 MG/DL (ref 70–99)
HBA1C MFR BLD: 6.8 % (ref 0–5.6)
HCV AB SERPL QL IA: NONREACTIVE
HIV 1+2 AB+HIV1 P24 AG SERPL QL IA: NONREACTIVE
POTASSIUM SERPL-SCNC: 4.1 MMOL/L (ref 3.4–5.3)
PROT SERPL-MCNC: 7.9 G/DL (ref 6.4–8.3)
SODIUM SERPL-SCNC: 138 MMOL/L (ref 136–145)

## 2022-09-22 PROCEDURE — 90471 IMMUNIZATION ADMIN: CPT | Performed by: FAMILY MEDICINE

## 2022-09-22 PROCEDURE — 99214 OFFICE O/P EST MOD 30 MIN: CPT | Mod: 25 | Performed by: FAMILY MEDICINE

## 2022-09-22 PROCEDURE — 36415 COLL VENOUS BLD VENIPUNCTURE: CPT | Performed by: FAMILY MEDICINE

## 2022-09-22 PROCEDURE — 86803 HEPATITIS C AB TEST: CPT | Performed by: FAMILY MEDICINE

## 2022-09-22 PROCEDURE — 83036 HEMOGLOBIN GLYCOSYLATED A1C: CPT | Performed by: FAMILY MEDICINE

## 2022-09-22 PROCEDURE — 91312 COVID-19,PF,PFIZER BOOSTER BIVALENT: CPT | Performed by: FAMILY MEDICINE

## 2022-09-22 PROCEDURE — 87389 HIV-1 AG W/HIV-1&-2 AB AG IA: CPT | Performed by: FAMILY MEDICINE

## 2022-09-22 PROCEDURE — 80053 COMPREHEN METABOLIC PANEL: CPT | Performed by: FAMILY MEDICINE

## 2022-09-22 PROCEDURE — 90662 IIV NO PRSV INCREASED AG IM: CPT | Performed by: FAMILY MEDICINE

## 2022-09-22 PROCEDURE — 0124A COVID-19,PF,PFIZER BOOSTER BIVALENT: CPT | Performed by: FAMILY MEDICINE

## 2022-09-22 RX ORDER — ATORVASTATIN CALCIUM 20 MG/1
20 TABLET, FILM COATED ORAL DAILY
Qty: 90 TABLET | Refills: 1 | Status: SHIPPED | OUTPATIENT
Start: 2022-09-22 | End: 2024-08-29 | Stop reason: DRUGHIGH

## 2022-09-22 RX ORDER — GLIPIZIDE 10 MG/1
TABLET ORAL
Qty: 135 TABLET | Refills: 1 | Status: SHIPPED | OUTPATIENT
Start: 2022-09-22 | End: 2024-08-29

## 2022-09-22 RX ORDER — BLOOD SUGAR DIAGNOSTIC
STRIP MISCELLANEOUS
Qty: 50 STRIP | Refills: 3 | Status: SHIPPED | OUTPATIENT
Start: 2022-09-22 | End: 2024-08-29

## 2022-09-22 RX ORDER — LOSARTAN POTASSIUM 50 MG/1
50 TABLET ORAL DAILY
Qty: 90 TABLET | Refills: 3 | Status: SHIPPED | OUTPATIENT
Start: 2022-09-22 | End: 2024-08-29
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
## 2022-09-23 RX ORDER — PHENOL 1.4 %
600 AEROSOL, SPRAY (ML) MUCOUS MEMBRANE 2 TIMES DAILY WITH MEALS
Qty: 60 TABLET | Refills: 11 | Status: SHIPPED | OUTPATIENT
Start: 2022-09-23 | End: 2024-08-29

## 2022-09-23 RX ORDER — ALENDRONATE SODIUM 70 MG/1
70 TABLET ORAL
Qty: 12 TABLET | Refills: 3 | Status: SHIPPED | OUTPATIENT
Start: 2022-09-23 | End: 2024-08-29

## 2023-02-06 ENCOUNTER — PATIENT OUTREACH (OUTPATIENT)
Dept: GERIATRIC MEDICINE | Facility: CLINIC | Age: 68
End: 2023-02-06
Payer: COMMERCIAL

## 2023-02-06 NOTE — PROGRESS NOTES
"[facility] Care Coordination Contact    Per CC, mailed client an \"Unable to Contact\" letter.      Lisette Valentine  Care Management Specialist  [facility]  [phone]        "

## 2023-02-06 NOTE — LETTER
February 6, 2023    BENNY VERGARA  [address] [place] [address]    Dear Benny:     I m your care coordinator. I ve been unable to reach you by phone. I am writing to ask you or your authorized representative to call me at [phone]. If you reach my voicemail, leave a message with your daytime phone number. Include a date and time that I can call you. If you are hearing impaired, call the [place] [employer] at 859 or [phone] (snzjqw-mh-umjwjh relay service).    The reason I am trying to reach you is:     [] To schedule an assessment  [] For your six (6)-month check-in  [x] Other: Please call to schedule your Annual Assessment with your Care Coordinator       Please call me as soon as you receive this letter. I look forward to speaking with you.    Sincerely,    LUCIANO Waddell, [facility]    E-mail: [email or web address]  Phone: [phone]      [employer]        [id number] accepted  [id number]                                                                        B  (08/2022)

## 2023-02-14 ENCOUNTER — PATIENT OUTREACH (OUTPATIENT)
Dept: GERIATRIC MEDICINE | Facility: CLINIC | Age: 68
End: 2023-02-14
Payer: COMMERCIAL

## 2023-02-14 NOTE — PROGRESS NOTES
[facility] Care Coordination Contact    Completed 4 attempts to reach client with no response.  Member is officially unable to contact effective today.  Completed MMIS entry.  Completed health plan required [facility] POC.    Follow-up Plan: CC will attempt to reach member in six months.    This CC note routed to PCP.    Cecilia WOLF, [facility] Care Coordinator   Telephone: [phone]  Fax: [phone]

## 2023-02-15 ENCOUNTER — PATIENT OUTREACH (OUTPATIENT)
Dept: GERIATRIC MEDICINE | Facility: CLINIC | Age: 68
End: 2023-02-15
Payer: COMMERCIAL

## 2023-05-20 ENCOUNTER — HEALTH MAINTENANCE LETTER (OUTPATIENT)
Age: 68
End: 2023-05-20

## 2023-06-04 ENCOUNTER — HEALTH MAINTENANCE LETTER (OUTPATIENT)
Age: 68
End: 2023-06-04

## 2023-07-20 ENCOUNTER — PATIENT OUTREACH (OUTPATIENT)
Dept: GERIATRIC MEDICINE | Facility: CLINIC | Age: 68
End: 2023-07-20
Payer: COMMERCIAL

## 2023-07-20 NOTE — LETTER
August 8, 2023    BENNY VERGARA  [address] [place] [address]    Dear Benny:     I m your care coordinator. I ve been unable to reach you by phone. I am writing to ask you or your authorized representative to call me at [phone]. If you reach my voicemail, leave a message with your daytime phone number. Include a date and time that I can call you. If you are hearing impaired, call the [place] [employer] at 885 or [phone] (khpzir-fr-yyqgwm relay service).    The reason I am trying to reach you is:     [] To schedule an assessment  [x] For your six (6)-month check-in  [] Other:      Please call me as soon as you receive this letter. I look forward to speaking with you.    Sincerely,    Mary Wilson RN    E-mail: [email or web address]  Phone: [phone]      [employer]        [id number] accepted  [id number]                                                                        B  (08/2022)

## 2023-08-08 NOTE — PROGRESS NOTES
"[facility] Care Coordination Contact    Per CC, mailed client an \"Unable to Contact\" letter. Mailed [employer] leave behind to member.    Mallory Hopper  Care Management Specialist  [facility]  [phone]    "

## 2023-08-12 ENCOUNTER — HEALTH MAINTENANCE LETTER (OUTPATIENT)
Age: 68
End: 2023-08-12

## 2023-08-14 ENCOUNTER — PATIENT OUTREACH (OUTPATIENT)
Dept: GERIATRIC MEDICINE | Facility: CLINIC | Age: 68
End: 2023-08-14

## 2023-08-14 NOTE — PROGRESS NOTES
[facility] Care Coordination Contact    Called family yes and member's phone listed  to complete six month assessment . CC unable to left a message. CC attempted 4 phone call attempts and 2 letters. CC request another letter to be mailed today as I was unable to leave a message. Mary Wilson RN,BSN  [facility] Case Coordinator   [phone]

## 2023-08-14 NOTE — LETTER
August 14, 2023    BENNY VERGARA  [address] [place] [address]    Dear Benny:     I m your care coordinator. I ve been unable to reach you by phone. I am writing to ask you or your authorized representative to call me at [phone]. If you reach my voicemail, leave a message with your daytime phone number. Include a date and time that I can call you. If you are hearing impaired, call the [place] [employer] at 101 or [phone] (ixtvnf-of-twupjl relay service).    The reason I am trying to reach you is:     [] To schedule an assessment  [x] For your six (6)-month check-in  [] Other:      Please call me as soon as you receive this letter. I look forward to speaking with you.    Sincerely,    Mary Wilson RN    E-mail: [email or web address]  Phone: [phone]      [employer]        [id number] accepted  [id number]                                                                        B  (08/2022)

## 2023-08-14 NOTE — PROGRESS NOTES
"Per CC, mailed client an \"Unable to Contact\" letter. Mailed [employer] leave behind.     Mallory Hopper  Care Management Specialist  [facility]  [phone]    "

## 2023-09-22 ENCOUNTER — PATIENT OUTREACH (OUTPATIENT)
Dept: GERIATRIC MEDICINE | Facility: CLINIC | Age: 68
End: 2023-09-22

## 2023-09-22 NOTE — LETTER
September 22, 2023    BENNY VERGARA  [address] [place] [address]      Dear Benny:    As a member of [facility] ([facility] you are provided a care coordinator. I will be your new care coordinator as of 10/01/2023. I will be calling you soon to see how you are doing and determine your needs.    If you have any questions, please feel free to call me at [phone]. If you reach my voice mail, please leave a message and your phone number. If you are hearing impaired, please call the [place] [employer] at 497 or [phone] (gzjnct-kq-nkgtdf relay service).    I look forward to speaking with you soon.    Sincerely,          Tomasz Fink RN, N  [phone]  [email or web address]      [facility]          [facility]  [id number] DHS Approved ([id number])  [id number] (11/18)

## 2023-09-22 NOTE — PROGRESS NOTES
[facility] Care Coordination Contact    Internal CC change effective 10/01/2023.  Mailed member CC Change letter.  Additional tasks to be completed by CMS include: update database & EPIC, enter CC Change in MMIS, and move member file.

## 2023-12-30 ENCOUNTER — HEALTH MAINTENANCE LETTER (OUTPATIENT)
Age: 68
End: 2023-12-30

## 2024-01-02 NOTE — PATIENT INSTRUCTIONS - HE
Patient Instructions by Socorro Sosa RT (R) at 10/29/2019  1:30 PM     Author: Socorro Sosa RT (R) Service: -- Author Type: Radiologic Technologist    Filed: 10/29/2019  2:28 PM Encounter Date: 10/29/2019 Status: Signed    : Emiliano Zimmer MD (Physician)       No

## 2024-01-18 ENCOUNTER — PATIENT OUTREACH (OUTPATIENT)
Dept: GERIATRIC MEDICINE | Facility: CLINIC | Age: 69
End: 2024-01-18

## 2024-01-18 NOTE — PROGRESS NOTES
"[facility] Care Coordination Contact    Per CC, mailed client an \"Unable to Contact\" letter.    Tomi Wise  [facility]  Case Management Specialist  [phone]        "

## 2024-01-18 NOTE — LETTER
January 18, 2024    BENNY VERGARA  [address] [place] [address]    Dear Benny:     I m your care coordinator. I ve been unable to reach you by phone. I am writing to ask you or your authorized representative to call me at [phone]. If you reach my voicemail, leave a message with your daytime phone number. Include a date and time that I can call you. If you are hearing impaired, call the [place] Relay at 581 or [phone] (vhpvjs-hc-fswced relay service).    The reason I am trying to reach you is:     [x] To schedule an assessment  [] For your six (6)-month check-in  [] Other:      Please call me as soon as you receive this letter. I look forward to speaking with you.    Sincerely,      Tomasz Fink RN, N  [phone]  [email or web address]      [facility] [employer]        [id number] accepted  [id number]                                                                        B  (08/2022)

## 2024-01-24 ENCOUNTER — PATIENT OUTREACH (OUTPATIENT)
Dept: GERIATRIC MEDICINE | Facility: CLINIC | Age: 69
End: 2024-01-24

## 2024-01-24 NOTE — PROGRESS NOTES
"[facility] Care Coordination Contact    Per CC, mailed client a \"Refusal of Home Visit\" letter.    Tomi Wise  [facility]  Case Management Specialist  [phone]          "

## 2024-01-24 NOTE — LETTER
January 24, 2024    BENNY VERGARA  [address] [place] [address]        Dear Benny:    As a member of [facility]'s MSC+ program, we offer a health risk assessment at no cost to you. I know you don't want to have the assessment right now. If you change your mind, please call me at the number below.    Who performs the health risk assessment?  A [facility] Care Coordinator performs the assessment. Our Care Coordinators can also help you understand your benefits. They can tell you about services to aid you at home, such as managing your care with your doctors if your health worsens.    Our Care Coordinators are here for you if you need:  Support for activities you used to do by yourself (including making meals, bathing and paying bills)  Equipment for bathroom or home safety  Help finding a new place to live  Information on staying healthy, preventing falls and immunizations    Questions?  If you have questions, or you would like to do he assessment, call me at [phone]. TTY users call [phone]. I'm here from 8am to 5pm. I may reach out to you again soon.       Sincerely,           Tomasz Fink RN, PHN  [phone]  [email or web address]      [facility] [employer]      \<[id number] accepted  [id number]>    [id number] (21/2021)

## 2024-01-24 NOTE — PROGRESS NOTES
[facility] Care Coordination Contact      [facility] Refusal Telephone Assessment    Member refused home visit HRA on 1/24/2024 (reason: MA termed, wants to wait until she gets her insurance figured out before CC goes out to see her).    ER visits: No  Hospitalizations: No  Health concerns: She'd like to schedule an appointment to establish care with a new provider.  Falls/Injuries: No  ADL/IADL Dependencies: No concerns at the moment.        Member currently receiving the following home care services:     Member currently receiving the following community resources:    Informal support(s):      Advanced Care Planning discussion, complete code section.    [facility] sponsored benefits: Shared information re: Silver Sneakers/gym memberships, ASA, Calcium +D.    Follow-Up Plan: Ruben states that she is no longer active under are and would like to wait until she gets her insurance figured out before CC goes out to see her. Ruben will have her children help her call the county to determine when she will be opened up again to [place]. She states that they just realized that her insurance ended and just sent renewal papers this month. Member informed that we will follow her for 3 months.  Contact information shared with member and family, encouraged member to call with any questions or concerns at any time.    This CC note routed to PCP, No primary care provider on file.Ruben confirms she has not seen a provider for a couple of years now.     Tomasz Fink RN, BSN, PHN  [facility]  Phone: [phone]

## 2024-07-27 ENCOUNTER — HEALTH MAINTENANCE LETTER (OUTPATIENT)
Age: 69
End: 2024-07-27

## 2024-07-28 ENCOUNTER — APPOINTMENT (OUTPATIENT)
Dept: RADIOLOGY | Facility: HOSPITAL | Age: 69
End: 2024-07-28
Attending: PHYSICIAN ASSISTANT
Payer: MEDICARE

## 2024-07-28 ENCOUNTER — HOSPITAL ENCOUNTER (EMERGENCY)
Facility: HOSPITAL | Age: 69
Discharge: HOME | End: 2024-07-28
Admitting: PHYSICIAN ASSISTANT
Payer: MEDICARE

## 2024-07-28 VITALS
RESPIRATION RATE: 20 BRPM | TEMPERATURE: 98.6 F | HEART RATE: 83 BPM | OXYGEN SATURATION: 96 % | WEIGHT: 100.8 LBS | DIASTOLIC BLOOD PRESSURE: 91 MMHG | BODY MASS INDEX: 23.33 KG/M2 | SYSTOLIC BLOOD PRESSURE: 191 MMHG | HEIGHT: 55 IN

## 2024-07-28 DIAGNOSIS — S52.509A DISTAL RADIUS FRACTURE: ICD-10-CM
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
## 2024-07-28 DIAGNOSIS — S62.102A CLOSED FRACTURE OF LEFT WRIST, INITIAL ENCOUNTER: ICD-10-CM

## 2024-07-28 PROCEDURE — 73110 X-RAY EXAM OF WRIST: CPT | Mod: LT

## 2024-07-28 PROCEDURE — 250N000013 HC RX MED GY IP 250 OP 250 PS 637: Performed by: PHYSICIAN ASSISTANT

## 2024-07-28 PROCEDURE — 73030 X-RAY EXAM OF SHOULDER: CPT | Mod: LT

## 2024-07-28 PROCEDURE — 73080 X-RAY EXAM OF ELBOW: CPT | Mod: LT

## 2024-07-28 PROCEDURE — 29125 APPL SHORT ARM SPLINT STATIC: CPT

## 2024-07-28 PROCEDURE — 99283 EMERGENCY DEPT VISIT LOW MDM: CPT

## 2024-07-28 RX ORDER — ACETAMINOPHEN 325 MG/1
975 TABLET ORAL ONCE
Status: COMPLETED | OUTPATIENT
Start: 2024-07-28 | End: 2024-07-28

## 2024-07-28 RX ORDER — OXYCODONE HYDROCHLORIDE 5 MG/1
5 TABLET ORAL EVERY 6 HOURS PRN
Qty: 6 TABLET | Refills: 0 | Status: SHIPPED | OUTPATIENT
Start: 2024-07-28 | End: 2024-07-31

## 2024-07-28 RX ADMIN — ACETAMINOPHEN 975 MG: 325 TABLET ORAL at 22:40

## 2024-07-28 ASSESSMENT — ACTIVITIES OF DAILY LIVING (ADL): ADLS_ACUITY_SCORE: 35

## 2024-07-28 ASSESSMENT — COLUMBIA-SUICIDE SEVERITY RATING SCALE - C-SSRS
2. HAVE YOU ACTUALLY HAD ANY THOUGHTS OF KILLING YOURSELF IN THE PAST MONTH?: NO
6. HAVE YOU EVER DONE ANYTHING, STARTED TO DO ANYTHING, OR PREPARED TO DO ANYTHING TO END YOUR LIFE?: NO
1. IN THE PAST MONTH, HAVE YOU WISHED YOU WERE DEAD OR WISHED YOU COULD GO TO SLEEP AND NOT WAKE UP?: NO

## 2024-07-29 ENCOUNTER — TRANSFERRED RECORDS (OUTPATIENT)
Dept: HEALTH INFORMATION MANAGEMENT | Facility: CLINIC | Age: 69
End: 2024-07-29
Payer: MEDICARE

## 2024-07-29 NOTE — DISCHARGE INSTRUCTIONS
You were seen here in the emergency department for evaluation of fall, left-sided wrist injury.  There is a fracture in one of the bones in your wrist.  We have placed you in a splint here in the emergency department.  Do not remove the splint, do not get it wet.  You can use ibuprofen or Tylenol at home for pain, dosage recommendations are included below.  Please follow-up with orthopedics, I have included the number for [place], call tomorrow morning and make an appointment to see them in clinic this week.    For pain or fever you may use:  -Tylenol 650 mg every 6 hours.  Max 4000 mg in 24 hours  Do not use thismedication with alcohol as it can cause liver problems.  -Ibuprofen 600 mg every 6 hours.  Max 3500 mg in 24 hours  Do not take this medication if you have a history of a GI bleed or have kidney problems.  You may use both of these medications at the same time or you can alternate them every 3 hours.  For example, Tylenol at 6 AM, ibuprofen at 9 AM, Tylenol at noon, etc.    I did send additional, stronger pain medication as need to the pharmacy- [facility] [address]

## 2024-07-29 NOTE — ED PROVIDER NOTES
EMERGENCY DEPARTMENT ENCOUNTER      NAME: Ruben Cisneros  AGE: 69 year old female  YOB: 1955  MRN: [id number]  EVALUATION DATE & TIME: 7/28/2024 10:11 PM    PCP: No primary care provider on file.    ED PROVIDER: Bill Ledezma PA-C    Chief Complaint   Patient presents with    Fall       FINAL IMPRESSION:  1. Closed fracture of left wrist, initial encounter    2. Fall, initial encounter    3. Distal radius fracture        ED COURSE & MEDICAL DECISION MAKING:    Pertinent Labs & Imaging studies reviewed. (See chart for details)  10:12 PM I met the patient and performed my initial interview and exam.   10:54 PM Patient updated on results, plan for discharge following splinting    69 year old female presents to the Emergency Department for evaluation of fall, left arm pain.     ED Course as of 07/28/24 2308   Sun Jul 28, 2024 2217 Patient is a 69-year-old female, past medical history of abnormal gait, hyperlipidemia, who presents emergency department for evaluation of mechanical fall.  Patient stepped out of the shower, landed on the left side, hitting the left arm on the floor.  She did not hit her head or lose consciousness.  Primarily complaining of left-sided wrist pain.  On examination here, pulses intact, can flex and extend the wrist mildly here, has pain with pronation supination, no significant deformity or skin breakdown.  Pulses intact, and and strength of the fingers intact.  Will obtain x-rays here in the emergency department.  She has no midline cervical, thoracic, or lumbar tenderness.  She denies any other acute injury.  Pain additionally to the elbow area, will obtain x-rays of the wrist, elbow, and shoulder on the left side.  Patient was given a dose of Tylenol here in the emergency department.  Suspect likely musculoskeletal sprain, strain, less could be either of these, more likely be acute fracture given age and pain.  Patient with no significant deformity.  Plan for x-ray imaging,  dose of pain medication here, and plan for discharge.   2241 I have independently reviewed the x-ray of the left wrist, there does appear to be a mildly impacted radius fracture.  Otherwise no significant displacement.   2253 XR Wrist Left G/E 3 Views  X-ray final read here shows acute comminuted impacted fracture of the distal radius with mild dorsal and radial displacement.   2256 Elbow  XR, G/E 3 views, left  X-ray elbow negative   2256 XR Shoulder Left 2 Views  X-ray of the shoulder negative   2308     Splint wet here in the emergency department, patient was given sling for treatment, CMS intact after splinting.  Patient be discharged at this time.       Medical Decision Making  Obtained supplemental history:Supplemental history obtained?: Family Member/Significant Other  Reviewed external records: External records reviewed?: Outpatient Record: Outpatient office visit on 09/22/2022, outpatient patient out reach on 01/24/2024  Care impacted by chronic illness:Hyperlipidemia  Care significantly affected by social determinants of health:N/A  Did you consider but not order tests?: Work up considered but not performed and documented in chart, if applicable  Did you interpret images independently?: Independent interpretation of ECG and images noted in documentation, when applicable.  Consultation discussion with other provider:Did you involve another provider (consultant, MH, pharmacy, etc.)?: No  Discharge. No recommendations on prescription strength medication(s). N/A.         At the conclusion of the encounter I discussed the results of all of the tests and the disposition. The questions were answered. The patient or family acknowledged understanding and was agreeable with the care plan.       0 minutes of critical care time     MEDICATIONS GIVEN IN THE EMERGENCY:  Medications   acetaminophen (TYLENOL) tablet 975 mg (975 mg Oral $Given 7/28/24 2240)       NEW PRESCRIPTIONS STARTED AT TODAY'S ER VISIT  New  "Prescriptions    No medications on file          =================================================================    HPI    Patient information was obtained from: Patient, Family     Use of : N/A      Ruben Cisneros is a 69 year old female with a pertinent history of hyperlipidemia who presents to this ED for evaluation of fall, left-sided wrist pain.  Patient reportedly was getting out of the shower, slipped and landed on her left side.  Primarily complaining of left-sided wrist pain.  No obvious deformity on arrival here.  Is able to flex and extend the elbow.  Does not have pain over the shoulder.  No blood thinners.  Did not hit head.  No loss of consciousness.    PAST MEDICAL HISTORY:  No past medical history on file.    PAST SURGICAL HISTORY:  No past surgical history on file.        CURRENT MEDICATIONS:    acetaminophen (TYLENOL EXTRA STRENGTH) 500 MG tablet  alendronate (FOSAMAX) 70 MG tablet  atorvastatin (LIPITOR) 20 MG tablet  blood glucose (ACCU-CHEK GUIDE) test strip  blood glucose monitoring (ACCU-CHEK FASTCLIX) lancets  calcium carbonate (OS-ANTIONE) 600 MG tablet  cholecalciferol 125 MCG (5000 UT) CAPS  glipiZIDE (GLUCOTROL) 10 MG tablet  lancing device with lancets (ACCU-CHEK FASTCLIX LANCING DEV) Kit  losartan (COZAAR) 50 MG tablet  sitagliptin (JANUVIA) 100 MG tablet         ALLERGIES:  Allergies   Allergen Reactions    Cefazolin Hives    Metformin Unknown     Nausea       FAMILY HISTORY:  No family history on file.    SOCIAL HISTORY:   Social History     Socioeconomic History    Marital status:    Tobacco Use    Smoking status: Never    Smokeless tobacco: Never       VITALS:  BP (!) 191/91   Pulse 83   Temp 98.6  F (37  C) (Oral)   Resp 20   Ht 1.372 m (4' 6\")   Wt 45.7 kg (100 lb 12.8 oz)   SpO2 96%   BMI 24.30 kg/m      PHYSICAL EXAM    Physical Exam  Vitals and nursing note reviewed.   Constitutional:       General: She is not in acute distress.     Appearance: Normal " appearance. She is normal weight. She is not toxic-appearing or diaphoretic.   HENT:      Right Ear: External ear normal.      Left Ear: External ear normal.   Eyes:      Conjunctiva/sclera: Conjunctivae normal.   Cardiovascular:      Pulses: Normal pulses.   Musculoskeletal:         General: Swelling, tenderness and signs of injury present. No deformity.      Comments: Tenderness over the left wrist without obvious deformity.  Patient has difficulty with pronation and supination.  Can move all fingers.  Mild tenderness over the posterior elbow.  Mild tenderness over the anterior shoulder.   Skin:     Findings: No bruising or rash.   Neurological:      Mental Status: She is alert. Mental status is at baseline.           LAB:  All pertinent labs reviewed and interpreted.  Labs Ordered and Resulted from Time of ED Arrival to Time of ED Departure - No data to display    RADIOLOGY:  Reviewed all pertinent imaging. Please see official radiology report.  Elbow  XR, G/E 3 views, left   Final Result   IMPRESSION: Normal joint spaces and alignment. No fracture or joint effusion.      XR Shoulder Left 2 Views   Final Result   IMPRESSION: Normal joint spaces and alignment. No fracture.      XR Wrist Left G/E 3 Views   Final Result   IMPRESSION: Acute comminuted impacted fracture of the distal radius metaphysis with mild dorsal and radial displacement.        PROCEDURES:     PROCEDURE: Splint Placement   INDICATIONS: left distal radial  fracture   PROCEDURE PROVIDER: Trent Ledezma PA-C   NOTE:  A Sugar tong splint made of Orthoglass was applied to the Left upper extremity by the above provider. As noted in the physical exam, distal CMS was intact prior to placement. The splint was checked and the fit was adjusted to ensure proper positioning after placement. Sensation and circulation, as well as motor function, are unchanged after splint placement and the patient is more comfortable with the splint in place.   Francisca Khan  GARY Ledezma  Emergency Medicine  [facility] EMERGENCY DEPARTMENT  [facility] [facility] [id number] [phone]  Dept: [phone]       Bill Ledezma PA-C  07/28/24 5316

## 2024-07-29 NOTE — ED TRIAGE NOTES
Patient arrives post fall at home about 45 minutes ago. Patient stepped out of shower and landed on back with left arm hitting the floor. Patient denies hitting head. No LOC or thinners.     Triage Assessment (Adult)       Row Name 07/28/24 7614          Triage Assessment    Airway WDL WDL        Respiratory WDL    Respiratory WDL WDL        Skin Circulation/Temperature WDL    Skin Circulation/Temperature WDL WDL        Cardiac WDL    Cardiac WDL WDL        Peripheral/Neurovascular WDL    Peripheral Neurovascular WDL WDL

## 2024-07-30 ENCOUNTER — PATIENT OUTREACH (OUTPATIENT)
Dept: GERIATRIC MEDICINE | Facility: CLINIC | Age: 69
End: 2024-07-30
Payer: MEDICARE

## 2024-07-30 NOTE — PROGRESS NOTES
[facility] Care Coordination Contact      [facility] Six-Month Telephone Assessment    6 month telephone assessment completed on 7/30/2024.    ER visits: Yes -  [facility]  Hospitalizations: No  TCU stays: No  Significant health status changes: Had a fall and did have a fracture on her wrist. She reports that she is doing well and has seen Ortho already for cast. She is to return in 2 weeks to follow up with Ortho. CC offered home visit since her medical assistance was reinstated.Ruben accepted offer stating that she does need help scheduling her physical appointment. CC will plan to help her set up appt. Home Visit scheduled for 8/5/2024 at 9:00 am.     Falls/Injuries: Yes: Ruben reports that she tripped and fell. No other symptoms.   ADL/IADL changes: No  Changes in services: No    Caregiver Assessment follow up:  n/a    Goals: See POC in chart for goal progress documentation.      Will see member in 6 months for an annual health risk assessment.   Encouraged member to call CC with any questions or concerns in the meantime.     Tomasz Fink RN, BSN, PHN  [facility]  Phone: [phone]

## 2024-08-05 ENCOUNTER — PATIENT OUTREACH (OUTPATIENT)
Dept: GERIATRIC MEDICINE | Facility: CLINIC | Age: 69
End: 2024-08-05

## 2024-08-15 ENCOUNTER — TRANSFERRED RECORDS (OUTPATIENT)
Dept: HEALTH INFORMATION MANAGEMENT | Facility: CLINIC | Age: 69
End: 2024-08-15
Payer: MEDICARE

## 2024-08-15 ENCOUNTER — PATIENT OUTREACH (OUTPATIENT)
Dept: GERIATRIC MEDICINE | Facility: CLINIC | Age: 69
End: 2024-08-15
Payer: MEDICARE

## 2024-08-15 NOTE — PROGRESS NOTES
[facility] Care Coordination Contact    Received after visit chart from care coordinator.  Completed following tasks: Mailed copy of care plan/support plan to member, Mailed [place] Choices signature sheet pages 3-4, Mailed Safe Medication Disposal , and Uploaded consent to communicate form(s) to Abraham Wise  [facility]  Case Management Specialist  [phone]

## 2024-08-15 NOTE — LETTER
August 15, 2024       Ruben Cisneros  [address] [place] [address]      Dear Ruben,    At [facility], we re dedicated to improving your health and wellness. Enclosed is the Support Plan developed with you on 8/5/24. Please review the Support Plan carefully.    As a reminder, during your visit we talked about:   Ways to manage your physical and mental health   Using health care to maintain and improve your health    Your preventive care needs      Remember to contact your care coordinator if you:   Are hospitalized or plan to be hospitalized    Have a fall     Have a change in your physical or mental health   Need help finding support or services    If you have questions or don t agree with your Support Plan, call me at [phone]. You can also call me if your needs change. TTY users call the [place] Relay at 491 or [phone] (ybmnte-fj-fnfwuu relay service).    Sincerely,         Tomasz Fink RN, PHN  [phone]  [email or web address]      [employer] [employer]                [id number] accepted     (06/2024)                [address] [place] [address]  [phone]  fax [phone]  [facility]

## 2024-08-26 ENCOUNTER — PATIENT OUTREACH (OUTPATIENT)
Dept: GERIATRIC MEDICINE | Facility: CLINIC | Age: 69
End: 2024-08-26
Payer: MEDICARE

## 2024-08-26 NOTE — PROGRESS NOTES
CC called and scheduled transportation with [employer] provide a ride for appointment scheduled on 8/29.     CC left voice message with transportation information.   Ride scheduled for  between 9:20 am and 9:30 am.   Phone number to call transportation for will call for  at clinic given as well in voice message.     Tomasz Fink RN, BSN, PHN  [facility]  Phone: [phone]

## 2024-08-29 ENCOUNTER — OFFICE VISIT (OUTPATIENT)
Dept: FAMILY MEDICINE | Facility: CLINIC | Age: 69
End: 2024-08-29
Payer: MEDICARE

## 2024-08-29 VITALS
DIASTOLIC BLOOD PRESSURE: 84 MMHG | HEIGHT: 56 IN | OXYGEN SATURATION: 96 % | BODY MASS INDEX: 22.72 KG/M2 | TEMPERATURE: 97.6 F | WEIGHT: 101 LBS | RESPIRATION RATE: 12 BRPM | HEART RATE: 71 BPM | SYSTOLIC BLOOD PRESSURE: 138 MMHG

## 2024-08-29 DIAGNOSIS — R80.9 MICROALBUMINURIA: ICD-10-CM

## 2024-08-29 DIAGNOSIS — Z29.11 NEED FOR RSV VACCINATION: ICD-10-CM

## 2024-08-29 DIAGNOSIS — Z00.00 ENCOUNTER FOR MEDICARE ANNUAL WELLNESS EXAM: Primary | ICD-10-CM

## 2024-08-29 DIAGNOSIS — Z23 NEED FOR PROPHYLACTIC VACCINATION AGAINST STREPTOCOCCUS PNEUMONIAE (PNEUMOCOCCUS): ICD-10-CM

## 2024-08-29 DIAGNOSIS — M81.0 OSTEOPOROSIS, UNSPECIFIED OSTEOPOROSIS TYPE, UNSPECIFIED PATHOLOGICAL FRACTURE PRESENCE: ICD-10-CM

## 2024-08-29 DIAGNOSIS — E55.9 VITAMIN D DEFICIENCY: ICD-10-CM

## 2024-08-29 DIAGNOSIS — Z23 NEED FOR SHINGLES VACCINE: ICD-10-CM

## 2024-08-29 DIAGNOSIS — E11.29 TYPE 2 DIABETES MELLITUS WITH MICROALBUMINURIA, WITHOUT LONG-TERM CURRENT USE OF INSULIN (H): ICD-10-CM

## 2024-08-29 DIAGNOSIS — R80.9 TYPE 2 DIABETES MELLITUS WITH MICROALBUMINURIA, WITHOUT LONG-TERM CURRENT USE OF INSULIN (H): ICD-10-CM

## 2024-08-29 DIAGNOSIS — Z29.11 NEED FOR VACCINATION AGAINST RESPIRATORY SYNCYTIAL VIRUS: ICD-10-CM

## 2024-08-29 DIAGNOSIS — Z12.11 SCREEN FOR COLON CANCER: ICD-10-CM

## 2024-08-29 DIAGNOSIS — M54.2 CERVICAL PAIN: ICD-10-CM

## 2024-08-29 DIAGNOSIS — E78.5 HYPERLIPIDEMIA, UNSPECIFIED HYPERLIPIDEMIA TYPE: ICD-10-CM

## 2024-08-29 DIAGNOSIS — I10 HYPERTENSION, UNSPECIFIED TYPE: ICD-10-CM

## 2024-08-29 DIAGNOSIS — Z12.31 VISIT FOR SCREENING MAMMOGRAM: ICD-10-CM

## 2024-08-29 LAB
ALBUMIN SERPL BCG-MCNC: 4.2 G/DL (ref 3.5–5.2)
ALP SERPL-CCNC: 94 U/L (ref 40–150)
ALT SERPL W P-5'-P-CCNC: 15 U/L (ref 0–50)
ANION GAP SERPL CALCULATED.3IONS-SCNC: 10 MMOL/L (ref 7–15)
AST SERPL W P-5'-P-CCNC: 22 U/L (ref 0–45)
BILIRUB SERPL-MCNC: 0.5 MG/DL
BUN SERPL-MCNC: 15.2 MG/DL (ref 8–23)
CALCIUM SERPL-MCNC: 9 MG/DL (ref 8.8–10.4)
CHLORIDE SERPL-SCNC: 106 MMOL/L (ref 98–107)
CHOLEST SERPL-MCNC: 196 MG/DL
CREAT SERPL-MCNC: 0.5 MG/DL (ref 0.51–0.95)
EGFRCR SERPLBLD CKD-EPI 2021: >90 ML/MIN/1.73M2
FASTING STATUS PATIENT QL REPORTED: ABNORMAL
FASTING STATUS PATIENT QL REPORTED: ABNORMAL
GLUCOSE SERPL-MCNC: 124 MG/DL (ref 70–99)
HBA1C MFR BLD: 9.1 % (ref 0–5.6)
HCO3 SERPL-SCNC: 24 MMOL/L (ref 22–29)
HDLC SERPL-MCNC: 42 MG/DL
LDLC SERPL CALC-MCNC: 136 MG/DL
NONHDLC SERPL-MCNC: 154 MG/DL
POTASSIUM SERPL-SCNC: 3.6 MMOL/L (ref 3.4–5.3)
PROT SERPL-MCNC: 8 G/DL (ref 6.4–8.3)
SODIUM SERPL-SCNC: 140 MMOL/L (ref 135–145)
TRIGL SERPL-MCNC: 88 MG/DL
VIT D+METAB SERPL-MCNC: 24 NG/ML (ref 20–50)

## 2024-08-29 PROCEDURE — 99214 OFFICE O/P EST MOD 30 MIN: CPT | Mod: 25 | Performed by: STUDENT IN AN ORGANIZED HEALTH CARE EDUCATION/TRAINING PROGRAM

## 2024-08-29 PROCEDURE — G0402 INITIAL PREVENTIVE EXAM: HCPCS | Performed by: STUDENT IN AN ORGANIZED HEALTH CARE EDUCATION/TRAINING PROGRAM

## 2024-08-29 PROCEDURE — 82306 VITAMIN D 25 HYDROXY: CPT | Performed by: STUDENT IN AN ORGANIZED HEALTH CARE EDUCATION/TRAINING PROGRAM

## 2024-08-29 PROCEDURE — 90677 PCV20 VACCINE IM: CPT | Performed by: STUDENT IN AN ORGANIZED HEALTH CARE EDUCATION/TRAINING PROGRAM

## 2024-08-29 PROCEDURE — G0009 ADMIN PNEUMOCOCCAL VACCINE: HCPCS | Performed by: STUDENT IN AN ORGANIZED HEALTH CARE EDUCATION/TRAINING PROGRAM

## 2024-08-29 PROCEDURE — 80061 LIPID PANEL: CPT | Performed by: STUDENT IN AN ORGANIZED HEALTH CARE EDUCATION/TRAINING PROGRAM

## 2024-08-29 PROCEDURE — 80053 COMPREHEN METABOLIC PANEL: CPT | Performed by: STUDENT IN AN ORGANIZED HEALTH CARE EDUCATION/TRAINING PROGRAM

## 2024-08-29 PROCEDURE — 36415 COLL VENOUS BLD VENIPUNCTURE: CPT | Performed by: STUDENT IN AN ORGANIZED HEALTH CARE EDUCATION/TRAINING PROGRAM

## 2024-08-29 PROCEDURE — 83036 HEMOGLOBIN GLYCOSYLATED A1C: CPT | Performed by: STUDENT IN AN ORGANIZED HEALTH CARE EDUCATION/TRAINING PROGRAM

## 2024-08-29 PROCEDURE — 99173 VISUAL ACUITY SCREEN: CPT | Mod: 59 | Performed by: STUDENT IN AN ORGANIZED HEALTH CARE EDUCATION/TRAINING PROGRAM

## 2024-08-29 RX ORDER — ACETAMINOPHEN 500 MG
500-1000 TABLET ORAL EVERY 6 HOURS PRN
Qty: 90 TABLET | Refills: 3 | Status: SHIPPED | OUTPATIENT
Start: 2024-08-29

## 2024-08-29 RX ORDER — ATORVASTATIN CALCIUM 40 MG/1
40 TABLET, FILM COATED ORAL DAILY
Qty: 90 TABLET | Refills: 3 | Status: SHIPPED | OUTPATIENT
Start: 2024-08-29

## 2024-08-29 RX ORDER — PHENOL 1.4 %
600 AEROSOL, SPRAY (ML) MUCOUS MEMBRANE 2 TIMES DAILY WITH MEALS
Qty: 60 TABLET | Refills: 11 | Status: SHIPPED | OUTPATIENT
Start: 2024-08-29

## 2024-08-29 RX ORDER — ALENDRONATE SODIUM 70 MG/1
70 TABLET ORAL
Qty: 12 TABLET | Refills: 3 | Status: SHIPPED | OUTPATIENT
Start: 2024-08-29

## 2024-08-29 RX ORDER — GLIPIZIDE 10 MG/1
TABLET ORAL
Qty: 90 TABLET | Refills: 3 | Status: SHIPPED | OUTPATIENT
Start: 2024-08-29

## 2024-08-29 NOTE — PROGRESS NOTES
DM2  Lab Results   Component Value Date    A1C 6.8 09/22/2022    A1C 7.4 04/25/2022    A1C 6.3 09/23/2021    A1C 6.8 06/17/2021    A1C 6.8 04/09/2021

## 2024-08-29 NOTE — Clinical Note
Pt was referred to [place] [facility] in [place], are you abl to help coordinate a ride for her.

## 2024-08-29 NOTE — PROGRESS NOTES
Preventive Care Visit  [facility]  Mehnaz Lacy MD, Family Medicine  Aug 29, 2024      Ruben was seen today for wellness visit.    Diagnoses and all orders for this visit:    Encounter for Medicare annual wellness exam  -     REVIEW OF HEALTH MAINTENANCE PROTOCOL ORDERS  -     PRIMARY CARE FOLLOW-UP SCHEDULING; Future    Osteoporosis, unspecified osteoporosis type, unspecified pathological fracture presence  Comments:  DEXA scan 1/9/2021.  Was started on alendronate.  Not been taking since 2022 due to loss of insurance.  Restart alendronate, vitamin D supplement as noted below, and calcium supplement.  Orders:  -     alendronate (FOSAMAX) 70 MG tablet; Take 1 tablet (70 mg) by mouth every 7 days.  -     calcium carbonate (OS-ANTIONE) 600 MG tablet; Take 1 tablet (600 mg) by mouth 2 times daily (with meals).    Need for shingles vaccine  Need for vaccination against respiratory syncytial virus  -     zoster vaccine recombinant adjuvanted (SHINGRIX) injection; Inject 0.5 mLs into the muscle once for 1 dose. Pharmacist administered  -     RSV vaccine, bivalent, ABRYSVO, injection; Inject 0.5 mLs into the muscle once for 1 dose. Pharmacist administered  Discussed, will need to get at pharmacy.    Type 2 diabetes mellitus with microalbuminuria, without long-term current use of insulin (H)  Chronic, was previously well-controlled, however patient has been off medication since 2022 since lapse of insurance.  Will restart glipizide, switch Januvia for Jardiance.  Recheck today.  Discussed referral to eye doctor for diabetic eye exam, patient was agreeable.  -     Adult Eye  Referral; Future  -     Albumin Random Urine Quantitative with Creat Ratio; Future  -     HEMOGLOBIN A1C; Future  -     Lipid panel reflex to direct LDL Non-fasting; Future  -     Comprehensive metabolic panel (BMP + Alb, Alk Phos, ALT, AST, Total. Bili, TP); Future  -     glipiZIDE (GLUCOTROL) 10 MG tablet; [GLIPIZIDE  (GLUCOTROL) 10 MG TABLET] TAKE 1 TABLET BY MOUTH EVERY MORNING AND 1/2 TABLET BY MOUTH EVERY AFTERNOON WITH FOOD  -     empagliflozin (JARDIANCE) 10 MG TABS tablet; Take 1 tablet (10 mg) by mouth daily.  -     HEMOGLOBIN A1C  -     Lipid panel reflex to direct LDL Non-fasting  -     Comprehensive metabolic panel (BMP + Alb, Alk Phos, ALT, AST, Total. Bili, TP)    Visit for screening mammogram  Discussed, patient declined due to not having any symptoms.  Discussed importance of screening when you are asymptomatic.  Patient reports understanding.  Will consider in the future.  Will continue counseling.    Screen for colon cancer  Discussed options, patient really did not want to do colonoscopy.  Will do fit test.  However, did discuss with patient that if the test is abnormal, will need colonoscopy.  Patient is agreeable.  -     Fecal colorectal cancer screen (FIT); Future    Need for prophylactic vaccination against Streptococcus pneumoniae (pneumococcus)  -     Pneumococcal 20 Valent Conjugate (PCV20)    Hyperlipidemia, unspecified hyperlipidemia type  Chronic, ASCVD risk 21%.  Been off medication since 2022.  Will increase Lipitor from 20 to 40 mg daily.  Recheck today.  -     atorvastatin (LIPITOR) 40 MG tablet; Take 1 tablet (40 mg) by mouth daily.    Microalbuminuria  Previously on losartan, however, blood pressure WNL being off medication for 2 years.  Will discontinue.  Recheck urine today.  SGLT2 started as noted above.  If positive, consider adding ACE or ARB.    Hypertension, unspecified type  Comments:  was previously on losartan 50mg daily, has not taken for 2 year, BP WNL, will discontinue.    Vitamin D deficiency  Noted per chart review.  Was on vitamin D supplementation daily, however has stopped since losing insurance in 2022.  Will recheck today.  Restart vitamin D supplementation.  -     cholecalciferol 125 MCG (5000 UT) CAPS; [CHOLECALCIFEROL, VITAMIN D3, 125 MCG (5,000 UNIT) CAPSULE] 1 p.o.  daily    Cervical pain  Chronic, intermittent.  Usually well-controlled with Tylenol.  Refilled  -     acetaminophen (TYLENOL) 500 MG tablet; Take 1-2 tablets (500-1,000 mg) by mouth every 6 hours as needed for mild pain.          Mitchel Miranda is a 69 year old, presenting for the following:  Wellness Visit         Health Care Directive  Patient does not have a Health Care Directive or Living Will: Discussed advance care planning with patient; information given to patient to review.    HPI   Pt reports she didn't have insurance for 2 years.       DM2  Lab Results   Component Value Date    A1C 6.8 09/22/2022    A1C 7.4 04/25/2022    A1C 6.3 09/23/2021    A1C 6.8 06/17/2021    A1C 6.8 04/09/2021 8/29/2024   General Health   How would you rate your overall physical health? Good   Feel stress (tense, anxious, or unable to sleep) Not at all            8/29/2024   Nutrition   Diet: Regular (no restrictions)             No data to display                  8/29/2024   Social Factors   Worry food won't last until get money to buy more No   Food not last or not have enough money for food? No   Do you have housing? (Housing is defined as stable permanent housing and does not include staying ouside in a car, in a tent, in an abandoned building, in an overnight shelter, or couch-surfing.) Yes   Are you worried about losing your housing? No   Lack of transportation? No   Unable to get utilities (heat,electricity)? No            8/29/2024   Fall Risk   Fallen 2 or more times in the past year? No    No   Trouble with walking or balance? No    No       Multiple values from one day are sorted in reverse-chronological order          8/29/2024   Activities of Daily Living- Home Safety   Needs help with the following daily activites None of the above   Safety concerns in the home None of the above            8/29/2024   Dental   Dentist two times every year? (!) NO            8/29/2024   Hearing Screening   Hearing  concerns? None of the above            8/29/2024   Driving Risk Screening   Patient/family members have concerns about driving No            8/29/2024   General Alertness/Fatigue Screening   Have you been more tired than usual lately? No            8/29/2024   Urinary Incontinence Screening   Bothered by leaking urine in past 6 months No            8/29/2024   TB Screening   Were you born outside of the [place]? Yes            Today's PHQ-2 Score:       8/29/2024    10:33 AM   PHQ-2 ( 1999 Pfizer)   Q1: Little interest or pleasure in doing things 0   Q2: Feeling down, depressed or hopeless 0   PHQ-2 Score 0   Q1: Little interest or pleasure in doing things Not at all   Q2: Feeling down, depressed or hopeless Not at all   PHQ-2 Score 0           8/29/2024   Substance Use   Alcohol more than 3/day or more than 7/wk Not Applicable   Do you have a current opioid prescription? No   How severe/bad is pain from 1 to 10? 0/10 (No Pain)   Do you use any other substances recreationally? No        Social History     Tobacco Use    Smoking status: Never    Smokeless tobacco: Never            ASCVD Risk   The 10-year ASCVD risk score (Nanci TORRES, et al., 2019) is: 21.5%    Values used to calculate the score:      Age: 69 years      Sex: Female      Is Non- : No      Diabetic: Yes      Tobacco smoker: No      Systolic Blood Pressure: 138 mmHg      Is BP treated: Yes      HDL Cholesterol: 36 mg/dL      Total Cholesterol: 184 mg/dL    Reviewed and updated as needed this visit by Provider                      Current providers sharing in care for this patient include:  Patient Care Team:  Clinic - [facility] as PCP - Tisha Lira MD as Assigned PCP  Tomasz Fink RN as Lead Care Coordinator (Primary Care - CC)    The following health maintenance items are reviewed in Epic and correct as of today:  Health Maintenance   Topic Date Due    ZOSTER IMMUNIZATION (1 of 2) Never done     "RSV VACCINE (1 - 1-dose 60+ series) Never done    EYE EXAM  09/21/2021    MICROALBUMIN  04/09/2022    A1C  03/22/2023    MAMMO SCREENING  04/06/2023    LIPID  04/25/2023    BMP  09/22/2023    COLORECTAL CANCER SCREENING  04/03/2024    COVID-19 Vaccine (6 - 2023-24 season) 04/21/2024    INFLUENZA VACCINE (1) 09/01/2024    MEDICARE ANNUAL WELLNESS VISIT  08/29/2025    DIABETIC FOOT EXAM  08/29/2025    ANNUAL REVIEW OF HM ORDERS  08/29/2025    FALL RISK ASSESSMENT  08/29/2025    DTAP/TDAP/TD IMMUNIZATION (3 - Td or Tdap) 11/01/2027    ADVANCE CARE PLANNING  08/29/2029    DEXA  01/07/2036    HEPATITIS C SCREENING  Completed    PHQ-2 (once per calendar year)  Completed    Pneumococcal Vaccine: 65+ Years  Completed    HPV IMMUNIZATION  Aged Out    MENINGITIS IMMUNIZATION  Aged Out    RSV MONOCLONAL ANTIBODY  Aged Out            Objective    Exam  /84 (BP Location: Left arm, Patient Position: Sitting, Cuff Size: Adult Regular)   Pulse 71   Temp 97.6  F (36.4  C) (Oral)   Resp 12   Ht 1.417 m (4' 7.79\")   Wt 45.8 kg (101 lb)   SpO2 96%   BMI 22.82 kg/m     Estimated body mass index is 22.82 kg/m  as calculated from the following:    Height as of this encounter: 1.417 m (4' 7.79\").    Weight as of this encounter: 45.8 kg (101 lb).    Physical Exam  General Appearance:  Alert, cooperative, no distress, appears stated age.  Head:  Normocephalic, without obvious abnormality, atraumatic.  Eyes:  Conjunctivae/corneas clear, extraocular movements intact both eyes.  Lungs:  Clear to auscultation bilaterally, respirations unlabored.  Heart:  Regular rate and rhythm, S1 and S2 normal, no murmur, rub or gallop.  Abdomen:  Soft, non-tender, bowel sounds active all four quadrants.   Extremities:  Atraumatic, no cyanosis or edema.  Skin:  Skin color, texture, turgor normal, no rashes or lesions.  Neurologic: No focal deficits.  Diabetic foot exam: normal DP and PT pulses, no trophic changes or ulcerative lesions, and normal " sensory exam          8/29/2024   Mini Cog   3 Item Recall 3 objects recalled            Vision Screen       Prior to immunization administration, verified patients identity using patient s name and date of birth. Please see Immunization Activity for additional information.     Screening Questionnaire for Adult Immunization    Are you sick today?   No   Do you have allergies to medications, food, a vaccine component or latex?   No   Have you ever had a serious reaction after receiving a vaccination?   No   Do you have a long-term health problem with heart, lung, kidney, or metabolic disease (e.g., diabetes), asthma, a blood disorder, no spleen, complement component deficiency, a cochlear implant, or a spinal fluid leak?  Are you on long-term aspirin therapy?   No   Do you have cancer, leukemia, HIV/AIDS, or any other immune system problem?   No   Do you have a parent, brother, or sister with an immune system problem?   No   In the past 3 months, have you taken medications that affect  your immune system, such as prednisone, other steroids, or anticancer drugs; drugs for the treatment of rheumatoid arthritis, Crohn s disease, or psoriasis; or have you had radiation treatments?   No   Have you had a seizure, or a brain or other nervous system problem?   No   During the past year, have you received a transfusion of blood or blood    products, or been given immune (gamma) globulin or antiviral drug?   No   For women: Are you pregnant or is there a chance you could become       pregnant during the next month?   No   Have you received any vaccinations in the past 4 weeks?   No     Immunization questionnaire answers were all negative.      Patient instructed to remain in clinic for 15 minutes afterwards, and to report any adverse reactions.     Screening performed by Evelin Wood CMA on 8/29/2024 at 10:44 AM.        Signed Electronically by: Mehnaz Lacy MD

## 2024-08-29 NOTE — PATIENT INSTRUCTIONS
"Patient Education     Your medications were sent to [facility] on [address]. If you would like your medications sent to a different pharmacy, please call us to let us know which on.    Check with your pharmacy regarding getting the RSV vaccine, and shingles vaccine, as Medicare will not cover these in clinic, however, they will cover it if you get at the pharmacy.       Fior Joe Xeeb Saib Xyuas Kom Tiv Thaiv Tus Kheej  Nov yog ib co fior joe xeeb uas peb yeej meem muab tony tib neeg pab lawv noj qab nyob zoo. Shira zaum koj pab pawg saib xyuas yuav muaj ib co fior joe xeeb uas tshwj xeeb tony koj nkaus xwb. Thov nrog koj pab pawg saib xyuas sib nasrin txog shira yam uas koj toob kelle kom tiv thaiv koj tus kheej.  Tristen Coj Lub Neej  Es xaws xais ntau carloz 150 feeb txhua lub zavala tiam (30 feeb txhua hnub, 5 hnub ib lub zavala tiam).  Ua yam pab cov leeg muaj zog tuaj 2 zaug txhua lub zavala tiam. Shira yam no pab koj tswj hwm koj lub cev qhov hnyav thiab tiv thaiv ntawm kab mob.  Txhob haus luam yeeb.  Pleev tshuaj tiv thaiv tshav kub kom thiaj tsis raug mob khees xaws ntawm nqaij tawv.  Txhua 2 mus tony 5 xyoos yuav tau kuaj koj lub tsev tony qhov radon. Radon yog ib edgardo arnel uas tsis muaj xim tsis muaj ntxhiab uas mob tau koj cov ntsws. Kom thiaj kawm ntxiv, mus saib [email or web address] thiab ntaus ntawv \"Radon in Homes.\"  Ceev cov phom kom tsis muaj mos txwv tony hauv thiab muab xauv philippe hauv ib qho chaw nyab xeeb xws li lub txhoj, los yog muab xauv philippe thiab muab cov yawm sij zais philippe. Muab cov mos txwv xauv tony hauv lwm qhov chaw nrug cov phom. Kom thiaj kawm ntxiv, mus saib [email or web address] thiab ntaus ntawv \"safe gun storage.\"  Tristen Noj Qab Huv  Noj 5 qho txiv hmab txiv ntoo thiab zaub txhua hnub.  Noj nplem nplej, txhuv xim av thiab cov fawm muaj nplej (los theej nplem dawb, txhuv dawb, thiab fawm dawb).  Ua tib zoo noj calcium thiab vitamin D ntau. Saib cov ntawv lo tony pob khoom noj thiab siv zog noj kom txog 100% RDA (qhov ntau " hauv ib hnub).  Yeej meem kuaj ntsuas  Txhua 6 lub hli mus kuaj hniav thiab muab tu.  Txhua xyoo mus saib koj pab pawg saib xyuas radha noj qab nyob zoo kom sib nasrin txog:  Ib yam dab tsi uas hloov ntawm koj txoj radha noj qab nyob zoo.  Cov tshuaj uas koj pab pawg tau hais kom siv.  Radha saib xyuas kom tiv thaiv, radha npaj tony tsev neeg, thiab yuav ua li jordana tiv thaiv ntawm kab mob uas ntev.  Radha txhaj tshuaj (koob tshuaj tiv thaiv)   Cov koob tshuaj HPV (txog thaum muaj 26 xyoo), yog koj yeej tsis tau txais carloz li.  Cov koob tshuaj Hepatitis B Kab Mob Siab (txog thaum muaj 59 xyoos), yog koj yeej tsis tau txais carloz li.  Koob tshuaj COVID-19: Txais koob tshuaj no thaum txog caij txais.  Koob tshuaj tiv thaiv ntawm mob khaub thuas: Txais txhua xyoo.  Koob tshuaj tiv thaiv mob daig tsaig: Txais txhua 10 xyoo.  Pneumococcal, hepatitis A, thiab RSV cov koob tshuaj: Nug koj pab pawg saib xyuas bimal puas tsim nyog tony koj txais cov no raws li koj qhov pheej hmoo.  Koob tshuaj tiv thaiv ntawm kab mob sawv hlwv (tony cov muaj 50 xyoo rov bharath).  Radha kuaj ntsuas tony radha noj qab nyob zoo  Kuaj mob ntshav qab zib:  Pib thaum muaj 35 xyoos, Mus kuaj mob ntshav qab zib txhua 3 xyoos los yog ntau zog.  Yog koj tseem tsis tau txog 37 xyoos, nug koj pab pawg saib xyuas bimal puas tsim nyog tony koj kuaj mob ntshav qab zib.  Kuaj cholesterol: Thaum muaj 39 xyoo, pib kuaj cholesterol txhua 5 xyoos, los yog ntau carloz ntawd yog kws tracey mob qhia.  Kuaj txha qhov tuab (DEXA): Thaum txog 50 xyoo lawd, nug koj pab pawg saib xyuas bimal puas tsim nyog tony koj kuaj txha bimal puas ruaj.  Kab Mob Siab Hepatitis C: Kuaj ib zaug hauv koj lub neej.  Kuaj Txoj Hlab Ntshav Hauv Plab: Nrog koj tus kws tracey mob nasrin txog radha kuaj ntsuas no yog koj:  Tau haus luam yeeb ib zaug li; thiab  Yog txiv neej; thiab  Nruab hnub nyoog 65 thiab 75.  Mob Erasto Cees (kis mob dhau ntawm radha sib deev)  Ua ntej muaj 24 xyoos: Nug koj pab pawg saib xyuas bimal puas tsim nyog kuaj tony  mob kelle cees.  Dieudonne qab muaj 24 xyoos: Mus kuaj tony mob kelle cees yog koj muaj radha pheej hmoo. Koj muaj radha pheej hmoo tony mob kelle cees (suav nrog HIV) yog:  Koj sib deev nrog ntau carloz ib tug neeg.  Koj tsis siv cov hnab looj qau thaum sib deev.  Koj los yog koj tus khub deev kuaj pom tias muaj mob kelle cees lawm.  Yog koj muaj radha pheej hmoo tony mob kelle cees HIV, nug txog cov tshuaj PrEP kom tiv thaiv ntawm HIV.  Mus kuaj tony mob kelle cees HIV yam ntau carloz ib zaug hauv koj lub neej, txawm yog koj muaj radha pheej hmoo tony mob kelle cees HIV los tsis muaj los xij.  Kuaj tony mob khees xaws  Kuaj lub ncauj tsev me nyuam tony mob khees xaws: Yog koj muaj ib lub ncauj tsev me nyuam, brandon yuav tau ib sij kuaj lub ncauj tsev me nyuam seb puas muaj mob khees xaws pib thaum muaj 21 xyoos. Neeg feem coob uas ib sij kuaj lub ncauj tsev me nyuam thiab tsis pom dab tsi txawv lawv tsum tau dieudonne qab muaj 65 xyoos. Nrog koj tus kws tracey mob sib nasrin txog qhov no.  Kuaj lub mis tony mob khees xaws (mammogram): Yog koj tau muaj mis carloz li, yuav tau ib sij kuaj lub mis pib thaum muaj 40 xyoo. Radha kuaj no yog kom saib seb puas muaj mob khees xaws hauv lub mis.  Kuaj Txoj Hnyuv Tony Mob Khees Xaws: Yeej tseem ceeb pib kuaj txoj hnyuv tony mob khees xaws pib thaum muaj 45 xyoos.  Txhua 10 xyoo yuav tau kuaj txoj hnyuv (los yog ntau zog yog koj muaj radha pheej hmoo) Los sis, nug koj tus kws tracey mob txog radha kuaj thooj quav FIT txhua xyoo los yog Cologuard txhua 3 xyoos.  Kom thiaj kawm ntxiv txog shira edgardo kuaj no, mus saib: [email or web address].  Yog xav tau radha pab txog qhov no, mus saib: [email or web address].  Kuaj lub destinee kua phev (prostate) tony mob margaritaees xaws: Yog koj muaj ib lub destinee kua phev (prostate) thiab nruab hnub nyoog 55 mus tony 69, nug koj tus kws tracey mob bimal puas tsim nyog tony koj kuaj lub destinee kua phev.  Kuaj ntsws tony mob khees xaws: Yog koj haus luam yeeb fontenot sim no los yog tau haus yav tas los uas muaj hnub nyoog nruab 50 xyoos mus  tony 80 xyoo, nug koj pab pawg saib xyuas bimal puas tsim nyog tony koj yeej meem kuaj lub ntsws tony mob khees xaws.    Tony cov edgardo phiaj radha siv ua ntaub ntawv qhia nkaus xwb. Yuav tsis pauv radha qhia los ntawm koj qhov chaw tracey mob. Copyright (felicita rowan)   2023 [facility].   Txhua txoj harpal raug tswj tseg lawm. Tshaj xyuas los ntawm [facility] [place] Trans[facility] Program. [employer] [id number] - REV 04/24.

## 2024-09-02 ENCOUNTER — PATIENT OUTREACH (OUTPATIENT)
Dept: GERIATRIC MEDICINE | Facility: CLINIC | Age: 69
End: 2024-09-02
Payer: MEDICARE

## 2024-09-05 NOTE — PROGRESS NOTES
[facility] Care Coordination Contact    CC received message from PCP asking CC to help coordinate appointment scheduling with [facility]. CC attempted to call Ruben Cisneros and left a voice message to return my call. Waiting for response.     Tomasz Fink RN, BSN, PHN  [facility]  Phone: [phone]

## 2024-09-05 NOTE — PROGRESS NOTES
[facility] Care Coordination Contact      CC called daughter Amish Her and left a voice message at that phone number as well.     Tomasz Fink RN, BSN, PHN  [facility]  Phone: [phone]

## 2024-09-06 ENCOUNTER — TRANSFERRED RECORDS (OUTPATIENT)
Dept: HEALTH INFORMATION MANAGEMENT | Facility: CLINIC | Age: 69
End: 2024-09-06
Payer: MEDICARE

## 2024-09-06 ENCOUNTER — TELEPHONE (OUTPATIENT)
Dept: FAMILY MEDICINE | Facility: CLINIC | Age: 69
End: 2024-09-06
Payer: MEDICARE

## 2024-09-06 NOTE — TELEPHONE ENCOUNTER
Patient Quality Outreach    Patient is due for the following:   Breast Cancer Screening - Mammogram    Next Steps:   Schedule a Adult Preventative    Type of outreach:    Sent Em[employer]rics message.      Questions for provider review:    None           Andrea Behrend

## 2024-09-10 ENCOUNTER — PATIENT OUTREACH (OUTPATIENT)
Dept: GERIATRIC MEDICINE | Facility: CLINIC | Age: 69
End: 2024-09-10

## 2024-09-10 NOTE — LETTER
September 10, 2024    BENNY VERGARA  [address] [place] [address]      Dear Benny,    Welcome to [facility] health program. My name is Tomasz Fink RN, PHN. I am your [facility] care coordinator. You're eligible for care coordination through your [facility] Product.    As your care coordinator, we ll:  Meet to go over your care coordination benefits  Talk about your physical and mental health care needs   Review your preventative care needs  Create a plan that meets your needs with the services you choose    What happens next?  I ll call you soon to introduce myself and tell you more about my role. We ll then plan time to go over your health and safety needs. Our goal is to keep you as healthy and independent as possible.    [facility] combines the benefits you may already have receive from Medical Assistance, Medicare and the Prescription Drug Coverage Program. Soon you'll receive a new member identification (ID) card from [facility]. Use this card whenever you get health services.    The [facility] care coordination program is voluntary and offered to you at no cost. If you wish to stop being in the care coordination program or have questions, call me at [phone]. If you reach my voicemail, leave a message and your phone number. TTY users, call the [place] Relay at 900 or [phone] (gkxcwd-py-vtvrpb relay service).    Sincerely,      Tomasz Fink RN, PHN  [phone]  [email or web address]      [facility] ([facility]) is a health plan that contracts with both Medicare and the [place] Medical Assistance (Medicaid) program to provide benefits of both programs to enrollees. Enrollment in [facility] depends on contract renewal.    [id number] accepted   [id number]         [id number] (07/2022)

## 2024-09-10 NOTE — PROGRESS NOTES
[facility] Care Coordination Contact    Member changed health plan products from [facility] to are [facility] effective 9/1/24. CC to complete items on Product Change/ Health Plan Change check list including MMIS entry, as applicable. CMS mailed Welcome Letter, supporting documents, [place] and other required tasks.    Tomi Wise  [facility]  Case Management Specialist  [phone]

## 2024-09-10 NOTE — PROGRESS NOTES
[facility] Care Coordination Contact    CC able to schedule appointment with [facility] in [place] on October 11, at 8:50 am. CC called and informed Ruben Cisneros of appointment.    CC tasked case management specialist to schedule transportation through [facility]. She will contact Ruben with transportation information when scheduled. ([facility] is only scheduling 30 days out).     Ruben asked CC to call her and remind her next month. CC will plan to call and remind Ruben the week of appointment.     Tomasz Fink RN, BSN, PHN  [facility]  Phone: [phone]

## 2024-09-16 ENCOUNTER — TELEPHONE (OUTPATIENT)
Dept: FAMILY MEDICINE | Facility: CLINIC | Age: 69
End: 2024-09-16
Payer: MEDICARE

## 2024-09-16 NOTE — TELEPHONE ENCOUNTER
RN made 1st call to pt to relay provider message re: lab results.     No answer. LM on   with instruction to call back and ask for triage nurse and let staff know this is a return call.      If pt  calls back, please relay provider message below.    Will try again.    Christin COHEN RN  [facility]       ----- Message from Mehnaz Lacy sent at 9/13/2024  5:03 PM CDT -----  Your cholesterol levels are worse than they were 2 years ago.  Were you taking your cholesterol medication every day?  If not, start taking it every day.  If you are worried you are, we will need to go up on the strength of your cholesterol medication.    Diabetes control has worsened.  Your A1c is 9.1.  Our goal would be to have this be less than 8.  I recommend we go up on your Jardiance medication.  I sent this to your pharmacy.  Stop taking the 10 mg and start taking the 25 mg.  I would also like you to see our diabetes educator if you are okay with that.  I can place a referral.    Electrolytes, kidney function, liver function are normal.    Vitamin D is a little low.  Make sure to take your vitamin D supplement every day.

## 2024-09-19 ENCOUNTER — PATIENT OUTREACH (OUTPATIENT)
Dept: GERIATRIC MEDICINE | Facility: CLINIC | Age: 69
End: 2024-09-19
Payer: MEDICARE

## 2024-09-19 NOTE — PROGRESS NOTES
[facility] Care Coordination Contact    Arranged transportation thru [facility] -[phone] for the below appt:  Appt Date & Time: 10/11/2024 at 8:50 am  Clinic Name & Address:    [facility] Location  [address] [place], [place] [id number]  Transportation Provider:   Marylou Baxter [phone]   time:  8-8:20 am  Return ride  time: will call    Notified member of  time.    Tomi Wise  [facility]  Case Management Specialist  [phone]

## 2024-09-19 NOTE — TELEPHONE ENCOUNTER
----- Message from Mehnaz Regino sent at 9/13/2024  5:03 PM CDT -----  Your cholesterol levels are worse than they were 2 years ago.  Were you taking your cholesterol medication every day?  If not, start taking it every day.  If you are worried you are, we will need to go up on the strength of your cholesterol medication.     Diabetes control has worsened.  Your A1c is 9.1.  Our goal would be to have this be less than 8.  I recommend we go up on your Jardiance medication.  I sent this to your pharmacy.  Stop taking the 10 mg and start taking the 25 mg.  I would also like you to see our diabetes educator if you are okay with that.  I can place a referral.     Electrolytes, kidney function, liver function are normal.     Vitamin D is a little low.  Make sure to take your vitamin D supplement every day.       RN spoke to daughter, Amish Her (CTC on file) to relay provider test results and recommendations above. Patient is not home.  Pharmacy medications sent to provided. RN advised daughter to call with patient back to clinic when patient home and test and or recommendations are not clear.   Clinic phone number provided.    Roland Cardozo RN  [facility]

## 2024-09-20 NOTE — TELEPHONE ENCOUNTER
Patient Quality Outreach    Patient is due for the following:   Breast Cancer Screening - Mammogram    Next Steps:   Schedule a Adult Preventative    Type of outreach:    Closing encounter.  The phone number listed is for the patient's daughter, there is a consent but no boxes are checked to indicate what information she can receive.  Put a note on the patient's chart.    Next Steps:  Reach out within 90 days via [employer].    Max number of attempts reached: Yes. Will try again in 90 days if patient still on fail list.    Questions for provider review:    None           Andrea Behrend

## 2024-09-23 ENCOUNTER — PATIENT OUTREACH (OUTPATIENT)
Dept: GERIATRIC MEDICINE | Facility: CLINIC | Age: 69
End: 2024-09-23
Payer: MEDICARE

## 2024-09-23 NOTE — PROGRESS NOTES
[facility] Care Coordination Contact    Called member RAGHAV Wellington and left a voice mail message to remind member to schedule Colonoscopy, Diabetic eye, and Mammogram exam(s).    Left contact info.   HAKAN Robison  [facility]  [phone]

## 2024-09-26 ENCOUNTER — PATIENT OUTREACH (OUTPATIENT)
Dept: GERIATRIC MEDICINE | Facility: CLINIC | Age: 69
End: 2024-09-26
Payer: MEDICARE

## 2024-09-26 NOTE — PROGRESS NOTES
[facility] Care Coordination Contact      [facility] Health Plan or Product Change    CC received notification that member's health plan or health plan product changed from [facility] MSC+ to [facility] MSHO effective 09/01/2024.  CC contacted member and discussed change and face-to-face visit was offered. Member declined need for home visit. CC [place] with member and no changes noted. Able to access previous assessment and support [place] information in [place].    Called member and introduced self as member s new CC. Confirmed with member that the welcome letter with writer's name and contact information has been received.  Reviewed [place] with member. No changes noted.  Transitional HRA completed. Support Plan updated and reflects current services.  Required referral authorization information communicated to CMS: Not applicable  Writer reviewed the following with member:  ER visits: No  Hospitalizations: No  TCU stays: No  Significant health status changes: No changes.  Falls/Injuries: No  ADL/IADL changes: No  Changes in services: No    Follow-Up Plan: Member informed of future contact, plan to f/u with member with at next regularly scheduled contact.  Contact information shared with member and family, encouraged member to call with any questions or concerns.    Tomasz Fink RN, BSN, PHN  [facility]  Phone: [phone]

## 2024-09-26 NOTE — Clinical Note
HI Dr. Lacy,   Just an FYI.  Thanks,  Tomasz Fink, RN, BSN, PHN [facility] Phone: [phone]

## 2025-02-12 ENCOUNTER — PATIENT OUTREACH (OUTPATIENT)
Dept: GERIATRIC MEDICINE | Facility: CLINIC | Age: 70
End: 2025-02-12
Payer: MEDICARE

## 2025-02-12 NOTE — PROGRESS NOTES
Erroneous Encounter.     Court Dang [facility], Manager   [employer]  [phone]

## 2025-02-12 NOTE — PROGRESS NOTES
[facility] Care Coordination Contact    [facility] Initial Assessment     Home visit for Initial Health Risk Assessment with Ruben Cisneros completed on August 5th, 2024       Current Care Plan  Member currently receiving the following home care services:   None   Member currently receiving the following community resources:    None     Medication Review  Medication reconciliation completed in Epic: If no, please explain No medications   Medication set-up & administration: Independent-does not set up.  Self-administers medications.  Medication Risk Assessment Medication (1 or more, place referral to MTM): N/A: No risk factors identified  MTM Referral Placed: No: No risk factors idenified    Mental/Behavioral Health   Depression Screening: No concerns         Falls Assessment: Ruben fell and her hurt her wrist last month. She is careful when walking.         ADL/IADL Dependencies: Ruben is independent. She even helps babysit her grandchildren.        Health Plan sponsored benefits: [employer] MSC+: Shared information regarding preventative health screening and health plan supplemental benefits/incentives. Reviewed medication disposal form and transition of care member handout.    CFSS Assessment completed at visit: Not Applicable     Elderly Waiver Eligibility: No-does not meet criteria    Care Plan & Recommendations: Ruben is doing well and had no need for services at this time.     [place] for detailed assessment information.    Follow-Up Plan: Member informed of future contact, plan to f/u with member with a 6 month telephone assessment.  Contact information shared with member and family, encouraged member to call with any questions or concerns at any time.    [facility] care continuum providers: Please see Snapshot and Care Management Flowsheets for Specific details of care plan.    This CC note routed to PCP, Mehnaz Lacy.       FABIÁN Kirk, Manager   [facility]  [phone]  (Last  entry- Tomasz Fink RN completed visit)

## 2025-02-12 NOTE — PROGRESS NOTES
[facility] Care Coordination Contact      [facility] Six-Month Telephone Assessment    6 month telephone assessment completed on 2/12/25.    ER visits: No  Hospitalizations: No  TCU stays: No  Significant health status changes: Ruben reports things have been stable and she is doing well.   Falls/Injuries: No  ADL/IADL changes: No  Changes in services: No    Caregiver Assessment follow up:  N/A     Goals: See Support Plan for goal progress documentation.      Will see member in 6 months for an annual health risk assessment.   Encouraged member to call CC with any questions or concerns in the meantime.       FABIÁN Kirk, Manager   [facility]  [phone]

## 2025-02-26 ENCOUNTER — PATIENT OUTREACH (OUTPATIENT)
Dept: GERIATRIC MEDICINE | Facility: CLINIC | Age: 70
End: 2025-02-26
Payer: MEDICARE

## 2025-02-26 NOTE — PROGRESS NOTES
[facility] Care Coordination Contact    Internal CC change effective 3/1/2025.  Mailed member CC Change letter.  Additional tasks to be completed by CMS include: update database & EPIC, enter CC Change in MMIS, and move member file.      Zari Olsen  Care Management Specialist   [facility]   [phone]

## 2025-02-26 NOTE — LETTER
February 26, 2025    BENNY VERGARA  [address] [place] [address]      Dear Benny:    As a member of [facility] ([facility] ([facility]), you are provided a care coordinator. I will be your new care coordinator as of 3/1/2025. I will be calling you soon to see how you are doing and determine your needs.    If you have any questions, please feel free to call me at [phone]. If you reach my voice mail, please leave a message and your phone number. If you are hearing impaired, please call the [place] Relay at 664 or [phone] (xaywvj-td-xcscrg relay service).    I look forward to speaking with you soon.    Sincerely,    LUCIANO Kennedy   [phone]  [email or web address]    [facility] is a health plan that contracts with both Medicare and the [place] Medical Assistance (Medicaid) program to provide benefits of both programs to enrollees. Enrollment in [facility] depends on contract renewal.      [facility]  [id number] DHS Approved ([id number])  [id number] (11/18)                  Adequate: hears normal conversation without difficulty

## 2025-03-16 ENCOUNTER — HEALTH MAINTENANCE LETTER (OUTPATIENT)
Age: 70
End: 2025-03-16

## 2025-07-14 ENCOUNTER — PATIENT OUTREACH (OUTPATIENT)
Dept: GERIATRIC MEDICINE | Facility: CLINIC | Age: 70
End: 2025-07-14
Payer: COMMERCIAL

## 2025-07-22 ENCOUNTER — OFFICE VISIT (OUTPATIENT)
Dept: FAMILY MEDICINE | Facility: CLINIC | Age: 70
End: 2025-07-22
Payer: COMMERCIAL

## 2025-07-22 VITALS
RESPIRATION RATE: 14 BRPM | OXYGEN SATURATION: 98 % | DIASTOLIC BLOOD PRESSURE: 70 MMHG | WEIGHT: 104 LBS | TEMPERATURE: 98.2 F | BODY MASS INDEX: 24.07 KG/M2 | HEART RATE: 74 BPM | SYSTOLIC BLOOD PRESSURE: 128 MMHG | HEIGHT: 55 IN

## 2025-07-22 DIAGNOSIS — E55.9 VITAMIN D DEFICIENCY: ICD-10-CM

## 2025-07-22 DIAGNOSIS — M81.0 OSTEOPOROSIS, UNSPECIFIED OSTEOPOROSIS TYPE, UNSPECIFIED PATHOLOGICAL FRACTURE PRESENCE: ICD-10-CM

## 2025-07-22 DIAGNOSIS — Z23 NEED FOR COVID-19 VACCINE: ICD-10-CM

## 2025-07-22 DIAGNOSIS — R80.9 TYPE 2 DIABETES MELLITUS WITH MICROALBUMINURIA, WITHOUT LONG-TERM CURRENT USE OF INSULIN (H): Primary | ICD-10-CM

## 2025-07-22 DIAGNOSIS — Z12.31 VISIT FOR SCREENING MAMMOGRAM: ICD-10-CM

## 2025-07-22 DIAGNOSIS — E78.5 HYPERLIPIDEMIA, UNSPECIFIED HYPERLIPIDEMIA TYPE: ICD-10-CM

## 2025-07-22 DIAGNOSIS — E11.29 TYPE 2 DIABETES MELLITUS WITH MICROALBUMINURIA, WITHOUT LONG-TERM CURRENT USE OF INSULIN (H): Primary | ICD-10-CM

## 2025-07-22 LAB
EST. AVERAGE GLUCOSE BLD GHB EST-MCNC: 272 MG/DL
HBA1C MFR BLD: 11.1 % (ref 0–5.6)

## 2025-07-22 PROCEDURE — 83036 HEMOGLOBIN GLYCOSYLATED A1C: CPT | Performed by: STUDENT IN AN ORGANIZED HEALTH CARE EDUCATION/TRAINING PROGRAM

## 2025-07-22 PROCEDURE — 90480 ADMN SARSCOV2 VAC 1/ONLY CMP: CPT | Performed by: STUDENT IN AN ORGANIZED HEALTH CARE EDUCATION/TRAINING PROGRAM

## 2025-07-22 PROCEDURE — 3046F HEMOGLOBIN A1C LEVEL >9.0%: CPT | Performed by: STUDENT IN AN ORGANIZED HEALTH CARE EDUCATION/TRAINING PROGRAM

## 2025-07-22 PROCEDURE — 3074F SYST BP LT 130 MM HG: CPT | Performed by: STUDENT IN AN ORGANIZED HEALTH CARE EDUCATION/TRAINING PROGRAM

## 2025-07-22 PROCEDURE — 3078F DIAST BP <80 MM HG: CPT | Performed by: STUDENT IN AN ORGANIZED HEALTH CARE EDUCATION/TRAINING PROGRAM

## 2025-07-22 PROCEDURE — G2211 COMPLEX E/M VISIT ADD ON: HCPCS | Performed by: STUDENT IN AN ORGANIZED HEALTH CARE EDUCATION/TRAINING PROGRAM

## 2025-07-22 PROCEDURE — 99214 OFFICE O/P EST MOD 30 MIN: CPT | Mod: 25 | Performed by: STUDENT IN AN ORGANIZED HEALTH CARE EDUCATION/TRAINING PROGRAM

## 2025-07-22 PROCEDURE — 36415 COLL VENOUS BLD VENIPUNCTURE: CPT | Performed by: STUDENT IN AN ORGANIZED HEALTH CARE EDUCATION/TRAINING PROGRAM

## 2025-07-22 PROCEDURE — 91320 SARSCV2 VAC 30MCG TRS-SUC IM: CPT | Performed by: STUDENT IN AN ORGANIZED HEALTH CARE EDUCATION/TRAINING PROGRAM

## 2025-07-22 RX ORDER — LANCETS
EACH MISCELLANEOUS
Qty: 102 EACH | Refills: 6 | Status: SHIPPED | OUTPATIENT
Start: 2025-07-22

## 2025-07-22 RX ORDER — ALENDRONATE SODIUM 70 MG/1
70 TABLET ORAL
Qty: 12 TABLET | Refills: 3 | Status: SHIPPED | OUTPATIENT
Start: 2025-07-22

## 2025-07-22 RX ORDER — PHENOL 1.4 %
600 AEROSOL, SPRAY (ML) MUCOUS MEMBRANE 2 TIMES DAILY WITH MEALS
Qty: 60 TABLET | Refills: 11 | Status: SHIPPED | OUTPATIENT
Start: 2025-07-22

## 2025-07-22 RX ORDER — ATORVASTATIN CALCIUM 40 MG/1
40 TABLET, FILM COATED ORAL DAILY
Qty: 90 TABLET | Refills: 3 | Status: SHIPPED | OUTPATIENT
Start: 2025-07-22

## 2025-07-22 RX ORDER — GLIPIZIDE 10 MG/1
TABLET ORAL
Qty: 90 TABLET | Refills: 3 | Status: SHIPPED | OUTPATIENT
Start: 2025-07-22

## 2025-07-22 NOTE — PATIENT INSTRUCTIONS
Check with your pharmacy regarding getting the RSV vaccine, and shingles vaccine, as Medicare will not cover these in clinic, however, they will cover it if you get at the pharmacy.     Call insurance about coverage for diabetes eye exam.

## 2025-07-22 NOTE — PROGRESS NOTES
Prior to immunization administration, verified patients identity using patient s name and date of birth. Please see Immunization Activity for additional information.     Screening Questionnaire for Adult Immunization    Are you sick today?   No   Do you have allergies to medications, food, a vaccine component or latex?   Yes   Have you ever had a serious reaction after receiving a vaccination?   No   Do you have a long-term health problem with heart, lung, kidney, or metabolic disease (e.g., diabetes), asthma, a blood disorder, no spleen, complement component deficiency, a cochlear implant, or a spinal fluid leak?  Are you on long-term aspirin therapy?   Yes   Do you have cancer, leukemia, HIV/AIDS, or any other immune system problem?   No   Do you have a parent, brother, or sister with an immune system problem?   No   In the past 3 months, have you taken medications that affect  your immune system, such as prednisone, other steroids, or anticancer drugs; drugs for the treatment of rheumatoid arthritis, Crohn s disease, or psoriasis; or have you had radiation treatments?   No   Have you had a seizure, or a brain or other nervous system problem?   No   During the past year, have you received a transfusion of blood or blood    products, or been given immune (gamma) globulin or antiviral drug?   No   For women: Are you pregnant or is there a chance you could become       pregnant during the next month?   No   Have you received any vaccinations in the past 4 weeks?   No     Immunization questionnaire was positive for at least one answer.  Notified provider.      Patient instructed to remain in clinic for 15 minutes afterwards, and to report any adverse reactions.     Screening performed by Parth Ma MA on 7/22/2025 at 10:30 AM.       Answers submitted by the patient for this visit:  Lipid Visit (Submitted on 7/22/2025)  Chief Complaint: Chronic problems general questions HPI Form  Are you regularly taking any  medication or supplement to lower your cholesterol?: No  Are you having muscle aches or other side effects that you think could be caused by your cholesterol lowering medication?: No  Hypertension Visit (Submitted on 7/22/2025)  Chief Complaint: Chronic problems general questions HPI Form  Do you check your blood pressure regularly outside of the clinic?: No  Are your blood pressures ever more than 140 on the top number (systolic) OR more than 90 on the bottom number (diastolic)? (For example, greater than 140/90): No  Are you following a low salt diet?: No  Diabetes Visit (Submitted on 7/22/2025)  Chief Complaint: Chronic problems general questions HPI Form  Frequency of checking blood sugars:: a few times a month  What time of day are you checking your blood sugars : before meals  Have you had any blood sugars above 200?: No  Have you had any blood sugars below 70?: No  Hypoglycemia symptoms:: dizziness, weakness  Diabetic concerns:: other  Paraesthesia present:: none of these symptoms  Have you had a diabetic eye exam within the last year?: No  General Questionnaire (Submitted on 7/22/2025)  Chief Complaint: Chronic problems general questions HPI Form  How many servings of fruits and vegetables do you eat daily?: 0-1  On average, how many sweetened beverages do you drink each day (Examples: soda, juice, sweet tea, etc.  Do NOT count diet or artificially sweetened beverages)?: 1  How many minutes a day do you exercise enough to make your heart beat faster?: 9 or less  How many days a week do you exercise enough to make your heart beat faster?: 3 or less  How many days per week do you miss taking your medication?: 7  What makes it hard for you to take your medication every day?: other  Questionnaire about: Chronic problems general questions HPI Form (Submitted on 7/22/2025)  Chief Complaint: Chronic problems general questions HPI Form

## 2025-07-22 NOTE — PROGRESS NOTES
Ruben was seen today for recheck medication.    Diagnoses and all orders for this visit:    Type 2 diabetes mellitus with microalbuminuria, without long-term current use of insulin (H)  Comments:  Chronic, uncontrolled, has not been checked in a year.  Refilled medication.  Recheck today.  RTC 3 months  Orders:  -     HEMOGLOBIN A1C; Future  -     empagliflozin (JARDIANCE) 25 MG TABS tablet; Take 1 tablet (25 mg) by mouth daily.  -     glipiZIDE (GLUCOTROL) 10 MG tablet; [GLIPIZIDE (GLUCOTROL) 10 MG TABLET] TAKE 1 TABLET BY MOUTH EVERY MORNING AND 1/2 TABLET BY MOUTH EVERY AFTERNOON WITH FOOD  -     blood glucose monitoring (ACCU-CHEK FASTCLIX) lancets; Use to test blood sugar 2 times daily.  -     blood glucose (NO BRAND SPECIFIED) test strip; Use to test blood sugar 2 times daily or as directed.  -     HEMOGLOBIN A1C    Hyperlipidemia, unspecified hyperlipidemia type  Comments:  Chronic, refilled medication.  Recheck today  Orders:  -     atorvastatin (LIPITOR) 40 MG tablet; Take 1 tablet (40 mg) by mouth daily.    Osteoporosis, unspecified osteoporosis type, unspecified pathological fracture presence  Comments:  Continue current medication.  Last DEXA 1/9/2021 showing osteoporosis.  Due for recheck.  Will discuss at next visit  Orders:  -     calcium carbonate (OS-ANTIONE) 600 MG tablet; Take 1 tablet (600 mg) by mouth 2 times daily (with meals).  -     alendronate (FOSAMAX) 70 MG tablet; Take 1 tablet (70 mg) by mouth every 7 days.    Vitamin D deficiency  Comments:  Refill medication  Orders:  -     cholecalciferol 125 MCG (5000 UT) CAPS; [CHOLECALCIFEROL, VITAMIN D3, 125 MCG (5,000 UNIT) CAPSULE] 1 p.o. daily    Visit for screening mammogram  -     MA Screening Bilateral w/ Bernard; Future    Need for COVID-19 vaccine  -     COVID-19 12+ (PFIZER)  -     REVIEW OF HEALTH MAINTENANCE PROTOCOL ORDERS      The longitudinal plan of care for the diagnosis(es)/condition(s) as documented were addressed during this visit.  Due to the added complexity in care, I will continue to support Ruben in the subsequent management and with ongoing continuity of care.          Subjective   Ruben is a 70 year old, presenting for the following health issues:  Recheck Medication        7/22/2025    10:28 AM   Additional Questions   Roomed by hser   Accompanied by daughter     History of Present Illness       Diabetes:   She presents for follow up of diabetes.  She is checking home blood glucose a few times a month.   She checks blood glucose before meals.  Blood glucose is never over 200 and never under 70. She is aware of hypoglycemia symptoms including dizziness and weakness.   She is concerned about other.    She is not experiencing numbness or burning in feet, excessive thirst, blurry vision, weight changes or redness, sores or blisters on feet. The patient has not had a diabetic eye exam in the last 12 months.          Hyperlipidemia:  She presents for follow up of hyperlipidemia.   She is not taking medication to lower cholesterol. She is not having myalgia or other side effects to statin medications.    Hypertension: She presents for follow up of hypertension.  She does not check blood pressure  regularly outside of the clinic. Outpatient blood pressures have not been over 140/90. She does not follow a low salt diet.     She eats 0-1 servings of fruits and vegetables daily.She consumes 1 sweetened beverage(s) daily.She exercises with enough effort to increase her heart rate 9 or less minutes per day.  She exercises with enough effort to increase her heart rate 3 or less days per week. She is missing 7 dose(s) of medications per week.  She is not taking prescribed medications regularly due to other.        Lab Results   Component Value Date    A1C 9.1 08/29/2024    A1C 6.8 09/22/2022    A1C 7.4 04/25/2022    A1C 6.3 09/23/2021    A1C 6.8 06/17/2021           Objective    /70 (BP Location: Right arm, Patient Position: Sitting, Cuff Size:  "Adult Regular)   Pulse 74   Temp 98.2  F (36.8  C) (Temporal)   Resp 14   Ht 1.397 m (4' 7\")   Wt 47.2 kg (104 lb)   LMP  (LMP Unknown)   SpO2 98%   BMI 24.17 kg/m    Body mass index is 24.17 kg/m .  Physical Exam   General: Well developed, well nourished.  Skin:  Dry without rash.    Head:  Normocephalic-atraumatic.    Eye:  Normal conjunctivae.     Respiratory:  Normal respiratory effort.   Gastrointestinal:  Non-distended.    Musculoskeletal:  No deformity or edema.  Neurologic: No focal deficits.          Signed Electronically by: Mehnaz Lacy MD    "

## 2025-07-24 ENCOUNTER — RESULTS FOLLOW-UP (OUTPATIENT)
Dept: FAMILY MEDICINE | Facility: CLINIC | Age: 70
End: 2025-07-24
Payer: COMMERCIAL

## 2025-07-25 ENCOUNTER — APPOINTMENT (OUTPATIENT)
Dept: INTERPRETER SERVICES | Facility: CLINIC | Age: 70
End: 2025-07-25
Payer: COMMERCIAL

## 2025-07-25 NOTE — TELEPHONE ENCOUNTER
First attempt: called with , left voicemail requesting call back. Please relay result note below from Dr. Lacy upon return call.     Christine Galarza RN BSN  [facility]

## 2025-07-28 NOTE — TELEPHONE ENCOUNTER
Attempt #2. No answer. LVM to call clinic and ask to speak to a nurse. Please relay Dr. Lacy's message below upon return call.      Arsalan FRANK RN  [facility]

## 2025-07-28 NOTE — TELEPHONE ENCOUNTER
RN made more attempts to reach patient, but no answer and unable to leave a message due to vm full.  Message left on spouse's  vm request a  return call for test result.  Clinic number provided.    Roland Cardozo RN   [facility]

## 2025-07-30 ENCOUNTER — PATIENT OUTREACH (OUTPATIENT)
Dept: CARE COORDINATION | Facility: CLINIC | Age: 70
End: 2025-07-30
Payer: COMMERCIAL

## 2025-07-31 ENCOUNTER — PATIENT OUTREACH (OUTPATIENT)
Dept: GERIATRIC MEDICINE | Facility: CLINIC | Age: 70
End: 2025-07-31
Payer: COMMERCIAL

## 2025-07-31 NOTE — PROGRESS NOTES
[facility] Care Coordination Contact    Received after visit chart from care coordinator.  Completed following tasks: Mailed copy of support [place] to member, Mailed [place] signature sheet pages 3-4, Mailed Safe Medication Disposal , and Mailed Transition of Care Member Handout    Carmela Jones  Care Management Specialist  [facility]  [phone]

## 2025-07-31 NOTE — LETTER
July 31, 2025       BENNY VERGARA  [address] [place] [address]      Dear Benny,    At [facility], we re dedicated to improving your health and wellness. Enclosed is the Support Plan developed with you on 7/16/2025. Please review the Support Plan carefully.    As a reminder, during your visit we talked about:   Ways to manage your physical and mental health   Using health care to maintain and improve your health    Your preventive care needs      Remember to contact your care coordinator if you:   Are hospitalized or plan to be hospitalized    Have a fall     Have a change in your physical or mental health   Need help finding support or services    If you have questions or don t agree with your Support Plan, call me at [phone]. You can also call me if your needs change. TTY users call the [place] Relay at 699 or [phone] (kqdzka-ck-gafyjx relay service).    Sincerely,       LUCIANO Kennedy   [phone]  [email or web address]                [id number] accepted     (06/2024)                [facility] [place] [address]  [phone]  fax [phone]  [facility]

## 2025-08-01 ENCOUNTER — ANCILLARY PROCEDURE (OUTPATIENT)
Dept: MAMMOGRAPHY | Facility: CLINIC | Age: 70
End: 2025-08-01
Attending: STUDENT IN AN ORGANIZED HEALTH CARE EDUCATION/TRAINING PROGRAM
Payer: COMMERCIAL

## 2025-08-01 DIAGNOSIS — Z12.31 VISIT FOR SCREENING MAMMOGRAM: ICD-10-CM

## 2025-08-01 PROCEDURE — 77067 SCR MAMMO BI INCL CAD: CPT | Mod: TC | Performed by: RADIOLOGY

## 2025-08-01 PROCEDURE — 77063 BREAST TOMOSYNTHESIS BI: CPT | Mod: TC | Performed by: RADIOLOGY

## 2025-08-13 ENCOUNTER — PATIENT OUTREACH (OUTPATIENT)
Dept: CARE COORDINATION | Facility: CLINIC | Age: 70
End: 2025-08-13
Payer: COMMERCIAL